# Patient Record
Sex: FEMALE | Race: WHITE | NOT HISPANIC OR LATINO | Employment: FULL TIME | URBAN - METROPOLITAN AREA
[De-identification: names, ages, dates, MRNs, and addresses within clinical notes are randomized per-mention and may not be internally consistent; named-entity substitution may affect disease eponyms.]

---

## 2017-02-07 ENCOUNTER — ALLSCRIPTS OFFICE VISIT (OUTPATIENT)
Dept: OTHER | Facility: OTHER | Age: 42
End: 2017-02-07

## 2017-02-07 DIAGNOSIS — Z12.31 ENCOUNTER FOR SCREENING MAMMOGRAM FOR MALIGNANT NEOPLASM OF BREAST: ICD-10-CM

## 2017-04-28 ENCOUNTER — ALLSCRIPTS OFFICE VISIT (OUTPATIENT)
Dept: OTHER | Facility: OTHER | Age: 42
End: 2017-04-28

## 2017-04-28 DIAGNOSIS — N63.0 BREAST LUMP: ICD-10-CM

## 2017-05-01 ENCOUNTER — HOSPITAL ENCOUNTER (OUTPATIENT)
Dept: MAMMOGRAPHY | Facility: CLINIC | Age: 42
Discharge: HOME | End: 2017-05-01
Payer: COMMERCIAL

## 2017-05-01 ENCOUNTER — HOSPITAL ENCOUNTER (OUTPATIENT)
Dept: ULTRASOUND IMAGING | Facility: CLINIC | Age: 42
Discharge: HOME | End: 2017-05-01
Payer: COMMERCIAL

## 2017-05-01 DIAGNOSIS — N63.0 BREAST LUMP: ICD-10-CM

## 2017-05-01 DIAGNOSIS — N63.0 LUMP OR MASS IN BREAST: ICD-10-CM

## 2017-05-01 PROCEDURE — 76642 ULTRASOUND BREAST LIMITED: CPT

## 2017-05-01 PROCEDURE — G0204 DX MAMMO INCL CAD BI: HCPCS

## 2018-01-13 VITALS
HEIGHT: 67 IN | BODY MASS INDEX: 24.01 KG/M2 | WEIGHT: 153 LBS | DIASTOLIC BLOOD PRESSURE: 70 MMHG | SYSTOLIC BLOOD PRESSURE: 110 MMHG

## 2018-01-14 VITALS
SYSTOLIC BLOOD PRESSURE: 118 MMHG | WEIGHT: 147.38 LBS | BODY MASS INDEX: 23.13 KG/M2 | DIASTOLIC BLOOD PRESSURE: 80 MMHG | HEIGHT: 67 IN

## 2018-01-18 NOTE — PROGRESS NOTES
Assessment    1  Poison ivy (692 6) (L23 7)    Plan  Poison ivy    · PredniSONE 10 MG Oral Tablet; TAKE 6 TABLETS TODAY, THEN DECREASE BY 1  TABLET EACH DAY UNTIL GONE   · Apply cold compresses 4 times a day for 20 minutes to help relieve pain or itching ;  Status:Complete;   Done: 21DMH6069   · Avoid exposing the rash to direct sunlight ; Status:Complete;   Done: 52MYZ7610   · Avoid soaps, lotions, and detergents that contain fragrances, deodorants, and other  additives ; Status:Complete;   Done: 06ZXJ8750   · The following may help your itching and prevent it from returning ; Status:Complete;    Done: 12NTN8660   · Call [phone] if: The rash is not better in 1 week ; Status:Complete;   Done:  02Apr2016 09:34AM   · Call [phone] if: The symptoms come back after a period of time of being normal ;  Status:Complete;   Done: 08MJI4829   · Call [phone] if: There is redness, swelling, or pain in the area ; Status:Complete;    Done: 92ZSS6476   · Call [phone] if: You have signs of infection in or around the affected area ;  Status:Complete;   Done: 84QRI4366   · Call [phone] if: Your rash is worse ; Status:Complete;   Done: 02Apr2016    Discussion/Summary  Discussion Summary:   Cool Compresses, Benadryl for itching, you can also try calamine lotion  follow up with PCP in 2-3 days or sooner if symptoms become worse  Medication Side Effects Reviewed: Possible side effects of new medications were reviewed with the patient/guardian today  Understands and agrees with treatment plan: The treatment plan was reviewed with the patient/guardian  The patient/guardian understands and agrees with the treatment plan   Counseling Documentation With Imm: The patient was counseled regarding instructions for management, patient and family education, importance of compliance with treatment  Follow Up Instructions: Follow Up with your Primary Care Provider in 2-3 days     If your symptoms worsen, go to the nearest [place] [address] Emergency Department  Chief Complaint    1  Rash  Chief Complaint Free Text Note Form: Pt is here with a rash around both ankles that has been there for 3 weeks now and has not improved  History of Present Illness  HPI: This is a 47year old female here today with complaints of poison ivy on her legs  He has noticed red vesicles on her lower legs that are itchy  They have been there for about 3 weeks  She has tried Benadryl spray and hydrocortisone cream with some relief  No other lesions anywhere else  Hospital Based Practices Required Assessment:   Pain Assessment   the patient states they do not have pain  The pain is located in the Ankles  The patient describes the pain as Itching  Abuse And Domestic Violence Screen    Yes, the patient is safe at home  The patient states no one is hurting them  Depression And Suicide Screen  No, the patient has not had thoughts of hurting themself  No, the patient has not felt depressed in the past 7 days  Review of Systems  Focused-Female:   Constitutional: No fever, no chills, feels well, no tiredness, no recent weight gain or loss  Cardiovascular: no complaints of slow or fast heart rate, no chest pain, no palpitations, no leg claudication or lower extremity edema  Respiratory: no complaints of shortness of breath, no wheezing, no dyspnea on exertion, no orthopnea or PND  Integumentary: as noted in HPI  ROS Reviewed:   ROS reviewed  Active Problems    1  Acute maxillary sinusitis (461 0) (J01 00)   2  Irregular menstrual bleeding (626 4) (N92 6)   3  Rash (782 1) (R21)   4  Seasonal allergies (477 9) (J30 2)    Past Medical History    1  No pertinent past medical history  Active Problems And Past Medical History Reviewed: The active problems and past medical history were reviewed and updated today  Family History    1  No pertinent family history    2   No pertinent family history  Family History Reviewed: The family history was reviewed and updated today  Social History    · Never a smoker  Social History Reviewed: The social history was reviewed and updated today  Surgical History    1  History of Appendectomy  Surgical History Reviewed: The surgical history was reviewed and updated today  Current Meds   1  Fluticasone Propionate 50 MCG/ACT Nasal Suspension; USE 1 SPRAY IN EACH   NOSTRIL TWICE DAILY; Therapy: 13UVG4758 to (Last RU:58JOI3072)  Requested for: 03Jun2015 Ordered  Medication List Reviewed: The medication list was reviewed and updated today  Allergies    1  Penicillins    Vitals  Signs [Data Includes: Current Encounter]   Recorded: 02Apr2016 08:56AM   Temperature: 97 7 F  Heart Rate: 64  Respiration: 16  Systolic: 451  Diastolic: 60  Height: 5 ft 7 in  Weight: 150 lb   BMI Calculated: 23 49  BSA Calculated: 1 79  O2 Saturation: 97    Physical Exam    Constitutional   General appearance: No acute distress, well appearing and well nourished  Pulmonary   Respiratory effort: No increased work of breathing or signs of respiratory distress  Auscultation of lungs: Clear to auscultation  Cardiovascular   Auscultation of heart: Normal rate and rhythm, normal S1 and S2, without murmurs  Skin multiple vsicular lesions on bilateral leg, erythema boarder around vesicles  Psychiatric   Orientation to person, place, and time: Normal     Mood and affect: Normal        Signatures   Electronically signed by : Owen Doyle; Apr 2 2016  6:24PM EST                       (Author)    Electronically signed by : Mi Harrison DO;  Apr 4 2016  7:18AM EST                       (Co-author)

## 2018-03-05 ENCOUNTER — OFFICE VISIT (OUTPATIENT)
Dept: OBGYN CLINIC | Facility: CLINIC | Age: 43
End: 2018-03-05
Payer: COMMERCIAL

## 2018-03-05 VITALS
SYSTOLIC BLOOD PRESSURE: 114 MMHG | DIASTOLIC BLOOD PRESSURE: 76 MMHG | HEIGHT: 67 IN | WEIGHT: 157 LBS | BODY MASS INDEX: 24.64 KG/M2

## 2018-03-05 DIAGNOSIS — Z12.39 BREAST CANCER SCREENING: ICD-10-CM

## 2018-03-05 DIAGNOSIS — Z01.419 ENCOUNTER FOR ANNUAL ROUTINE GYNECOLOGICAL EXAMINATION: Primary | ICD-10-CM

## 2018-03-05 PROBLEM — N63.20 LEFT BREAST LUMP: Status: ACTIVE | Noted: 2017-04-28

## 2018-03-05 PROCEDURE — 99396 PREV VISIT EST AGE 40-64: CPT | Performed by: OBSTETRICS & GYNECOLOGY

## 2018-03-05 RX ORDER — CLOTRIMAZOLE AND BETAMETHASONE DIPROPIONATE 10; .64 MG/G; MG/G
CREAM TOPICAL
COMMUNITY
Start: 2017-10-19 | End: 2021-01-04

## 2018-03-05 RX ORDER — MULTIVITAMIN
1 TABLET ORAL DAILY
COMMUNITY

## 2018-03-05 NOTE — PROGRESS NOTES
Subjective  HPI:     Ricardo Whitlock is a 38 y o  female  She is a  3 Para 3, with 3 prior vaginal deliveries  Her menstrual is absent due to uterine ablation 2 years ago  Her current method of contraception includes vasectomy  She denies issues with intimacy  She complains of constipation, states she only has a BM about once a week  She does eat a well balanced diet with increased fiber and drinks adequate water  She exercises regularly  No  complaints  No Gyn complaints  She denies depression/anxiety  She denies any new family stressor or illnesses  Her dental care is up-to-date  Gynecologic History    No LMP recorded (lmp unknown)  Patient has had an ablation  Last Pap:  Results were: normal  Last mammogram: 17  Results were: normal  Was recommend to get a 3D Mammogram      Obstetric History    OB History    Para Term  AB Living   3 3       3   SAB TAB Ectopic Multiple Live Births           3      # Outcome Date GA Lbr Roland/2nd Weight Sex Delivery Anes PTL Lv   3 Para 06/10/11    M       2 Para 07    M       1 Para 10/28/05    F              The following portions of the patient's history were reviewed and updated as appropriate: allergies, current medications, past family history, past medical history, past social history, past surgical history and problem list     Review of Systems    Pertinent items are noted in HPI  Objective     /76   Ht 5' 7" (1 702 m)   Wt 71 2 kg (157 lb)   LMP  (LMP Unknown)   Breastfeeding?  No   BMI 24 59 kg/m²      General appearance: alert and oriented, in no acute distress  Neck: no adenopathy, supple, symmetrical, trachea midline and thyroid not enlarged, symmetric, no tenderness/mass/nodules  Lungs: clear to auscultation bilaterally  Breasts: normal appearance, no masses or tenderness  Heart: regular rate and rhythm, S1, S2 normal, no murmur, click, rub or gallop  Abdomen: soft, non-tender; bowel sounds normal; no masses, no organomegaly  Pelvic: external genitalia normal, urethra without abnormality or discharge, perianal skin: no external genital warts noted, vagina normal without discharge, uterus normal size, shape, and consistency, no cervical motion tenderness, cervix normal in appearance, no adnexal masses or tenderness, adnexae not palpable and no bladder tenderness  Extremities: extremities normal, warm and well-perfused; no cyanosis, clubbing, or edema  Skin: Skin color, texture, turgor normal  No rashes or lesions  Lymph nodes: Cervical adenopathy: normal and Axillary adenopathy: normal      Assessment and Plan    Patient informed of a Stable GYN exam  A pap smear was not performed due a negative HPV pap in 2016  Patient is due for mammogram screening in May, it was recommended she have a 3D mammogram due to her fibrous breast  Advised patient to consult a GI provider regarding her constipation  She is to follow up in one year or sooner if needed  Mammogram ordered  Follow up in: 1 year

## 2018-03-05 NOTE — PATIENT INSTRUCTIONS
Consult a GI provider regarding your constipation  Constipation   AMBULATORY CARE:   Constipation  is when you have hard, dry bowel movements, or you go longer than usual between bowel movements  Constipation may be caused by a lack of water or high-fiber foods  Medicines used to treat pain or depression, or a lack of physical activity may also cause constipation  Common symptoms include the following:   · Trouble pushing out your bowel movement    · Pain or bleeding during your bowel movement    · A feeling that you did not finish having your bowel movement    · Nausea    · Bloating    · Headache  Seek immediate care for the following symptoms:   · Blood in your bowel movement    · A fever and abdominal pain with the constipation  Contact your healthcare provider if:   · Your constipation gets worse  · You start to vomit  · You have questions or concerns about your condition or care  Medicines:   · Medicine or a fiber supplement  may help make your bowel movement softer  A laxative may help relax and loosen your intestines to help you have a bowel movement  You may also be given medicine to increase fluid in your intestines  The fluid may help move bowel movements through your intestines  · Take your medicine as directed  Contact your healthcare provider if you think your medicine is not helping or if you have side effects  Tell him of her if you are allergic to any medicine  Keep a list of the medicines, vitamins, and herbs you take  Include the amounts, and when and why you take them  Bring the list or the pill bottles to follow-up visits  Carry your medicine list with you in case of an emergency  Manage or prevent constipation:   · Drink liquids as directed  You may need to drink extra liquids to help soften and move your bowels  Ask how much liquid to drink each day and which liquids are best for you  · Eat high-fiber foods    This may help decrease constipation by adding bulk to your bowel movements  High-fiber foods include fruit, vegetables, whole-grain breads and cereals, and beans  Your healthcare provider or dietitian can help you create a high-fiber meal plan  · Exercise regularly  Regular physical activity can help stimulate your intestines  Ask which exercises are best for you  · Schedule a time each day to have a bowel movement  This may help train your body to have regular bowel movements  Bend forward while you are on the toilet to help move the bowel movement out  Sit on the toilet for at least 10 minutes, even if you do not have a bowel movement  Follow up with your healthcare provider as directed:  Write down your questions so you remember to ask them during your visits  © 2017 [address] Information is for End User's use only and may not be sold, redistributed or otherwise used for commercial purposes  All illustrations and images included in CareNotes® are the copyrighted property of [employer]  or Hesham Sawyer  The above information is an  only  It is not intended as medical advice for individual conditions or treatments  Talk to your doctor, nurse or pharmacist before following any medical regimen to see if it is safe and effective for you

## 2018-04-30 ENCOUNTER — TRANSCRIBE ORDERS (OUTPATIENT)
Dept: RADIOLOGY | Facility: CLINIC | Age: 43
End: 2018-04-30

## 2018-05-02 ENCOUNTER — HOSPITAL ENCOUNTER (OUTPATIENT)
Dept: RADIOLOGY | Age: 43
Discharge: HOME | End: 2018-05-02
Payer: COMMERCIAL

## 2018-05-02 PROCEDURE — 77063 BREAST TOMOSYNTHESIS BI: CPT

## 2018-05-02 PROCEDURE — 77067 SCR MAMMO BI INCL CAD: CPT

## 2018-05-03 ENCOUNTER — TELEPHONE (OUTPATIENT)
Dept: OBGYN CLINIC | Facility: CLINIC | Age: 43
End: 2018-05-03

## 2018-05-03 NOTE — TELEPHONE ENCOUNTER
Lm pt's as re: 3D mgram results 5/3/18 - "extremely dense" breasts - to continue 3D screening mgram, can have ABUS now

## 2018-05-03 NOTE — TELEPHONE ENCOUNTER
----- Message from Bryson Zelaya, [address] sent at 5/3/2018 10:11 AM EDT -----  Please inform patient of normal mammogram results

## 2018-05-03 NOTE — TELEPHONE ENCOUNTER
Pt informed of mgram results & recom to continue 3D screening mgram - given dx & cpt codes for ABUS - she will recall office if wants to schedule ABUS

## 2018-05-07 ENCOUNTER — TELEPHONE (OUTPATIENT)
Dept: OBGYN CLINIC | Facility: CLINIC | Age: 43
End: 2018-05-07

## 2018-05-07 DIAGNOSIS — R92.2 DENSE BREAST TISSUE ON MAMMOGRAM: Primary | ICD-10-CM

## 2018-05-17 ENCOUNTER — HOSPITAL ENCOUNTER (OUTPATIENT)
Dept: ULTRASOUND IMAGING | Facility: CLINIC | Age: 43
Discharge: HOME | End: 2018-05-17
Payer: COMMERCIAL

## 2018-05-17 DIAGNOSIS — R92.2 DENSE BREAST TISSUE ON MAMMOGRAM: ICD-10-CM

## 2018-05-17 PROCEDURE — 76641 ULTRASOUND BREAST COMPLETE: CPT

## 2018-05-17 PROCEDURE — 76377 3D RENDER W/INTRP POSTPROCES: CPT

## 2018-05-21 ENCOUNTER — HOSPITAL ENCOUNTER (OUTPATIENT)
Dept: ULTRASOUND IMAGING | Facility: CLINIC | Age: 43
Discharge: HOME | End: 2018-05-21
Payer: COMMERCIAL

## 2018-05-21 DIAGNOSIS — R92.8 ABNORMAL ULTRASOUND OF BREAST: ICD-10-CM

## 2018-05-21 PROCEDURE — 76642 ULTRASOUND BREAST LIMITED: CPT

## 2018-05-21 NOTE — PROGRESS NOTES
Met with patient and Dr Quin Alegria regarding recommendation for;      _____ RIGHT ___x___LEFT      __x___Ultrasound guided  ______Stereotactic  Breast biopsy  ___x__Verbalized understanding        Blood thinners:  _____yes __x___no    Date stopped: ___n/a________    Biopsy teaching sheet given:  __x_____yes ______no

## 2018-05-22 ENCOUNTER — TELEPHONE (OUTPATIENT)
Dept: OBGYN CLINIC | Facility: CLINIC | Age: 43
End: 2018-05-22

## 2018-05-22 NOTE — TELEPHONE ENCOUNTER
----- Message from Lloyd Alberto, [address] sent at 5/21/2018  9:14 AM EDT -----  Please inform patient of results and need US of bilateral breast

## 2018-05-22 NOTE — TELEPHONE ENCOUNTER
Reviewing pt's chart - pt had diag bilat u/s done 5/21/18 - recom 6 month f/u (R) breast U/S for prob complicated cyst, recom u/s guided core needle biopsy (L) breast

## 2018-05-22 NOTE — TELEPHONE ENCOUNTER
----- Message from Jessica Rivera, [address] sent at 5/21/2018  9:14 AM EDT -----  Please inform patient of results and need US of bilateral breast

## 2018-05-22 NOTE — TELEPHONE ENCOUNTER
Lm pt's as - pt had ABUS for f/u extremely dense breast tissue on mgram - recom diag bilateral u/s f/u (L) breast hypoechoic lesion & (R) breast lesion c/w complicated cyst or debris filled duct

## 2018-05-25 ENCOUNTER — HOSPITAL ENCOUNTER (OUTPATIENT)
Dept: ULTRASOUND IMAGING | Facility: CLINIC | Age: 43
Discharge: HOME | End: 2018-05-25
Admitting: RADIOLOGY
Payer: COMMERCIAL

## 2018-05-25 ENCOUNTER — HOSPITAL ENCOUNTER (OUTPATIENT)
Dept: ULTRASOUND IMAGING | Facility: CLINIC | Age: 43
Discharge: HOME | End: 2018-05-25
Payer: COMMERCIAL

## 2018-05-25 VITALS — SYSTOLIC BLOOD PRESSURE: 98 MMHG | HEART RATE: 48 BPM | DIASTOLIC BLOOD PRESSURE: 70 MMHG

## 2018-05-25 DIAGNOSIS — R92.8 ABNORMAL FINDINGS ON DIAGNOSTIC IMAGING OF BREAST: ICD-10-CM

## 2018-05-25 DIAGNOSIS — R92.8 ABNORMAL MAMMOGRAM: ICD-10-CM

## 2018-05-25 PROCEDURE — 88342 IMHCHEM/IMCYTCHM 1ST ANTB: CPT | Performed by: PATHOLOGY

## 2018-05-25 PROCEDURE — 88305 TISSUE EXAM BY PATHOLOGIST: CPT | Performed by: PATHOLOGY

## 2018-05-25 PROCEDURE — 88341 IMHCHEM/IMCYTCHM EA ADD ANTB: CPT | Performed by: PATHOLOGY

## 2018-05-25 PROCEDURE — 19083 BX BREAST 1ST LESION US IMAG: CPT

## 2018-05-25 RX ORDER — LIDOCAINE HYDROCHLORIDE 10 MG/ML
5 INJECTION, SOLUTION INFILTRATION; PERINEURAL ONCE
Status: COMPLETED | OUTPATIENT
Start: 2018-05-25 | End: 2018-05-25

## 2018-05-25 RX ADMIN — LIDOCAINE HYDROCHLORIDE 5 ML: 10 INJECTION, SOLUTION INFILTRATION; PERINEURAL at 10:22

## 2018-05-25 NOTE — PROGRESS NOTES
Ice pack given:    __x___yes _____no    Discharge instructions signed by patient:    ___x__yes _____no    Discharge instructions given to patient:    __x_yes _____no    Discharged via:    _x____amulatory    _____wheelchair    _____stretcher    Stable on discharge:    __x___yes ____no

## 2018-05-25 NOTE — PROGRESS NOTES
Patient arrived via:    __x___ambulatory    _____wheelchair    _____stretcher      Domestic violence screen    ___x___negative______positive    Breast Implants:    _______yes ___x_____no

## 2018-05-25 NOTE — PROGRESS NOTES
POST LARGE CORE BREAST BIOPSY PATIENT INFORMATION      1  Place an ice pack inside your bra over the top of the dressing every hour for 20 minutes (20 minutes on, 60 minutes off)  Do this until bedtime  2  Do not shower or bathe until the following morning  3  You may bathe your breast carefully with the steri-strips in place  Be careful    Not to loosen them  The steri-strips will fall off in 3-5 days  4  You may have mild discomfort, and you may have some bruising where the   Needle entered the skin  This should clear within 5-7 days  5  If you need medicine for discomfort, take acetaminophen products such as   Tylenol  You may also take Advil or Motrin products  6  Do not participate in strenuous activities such as-tennis, aerobics, skiing,  Weight lifting, etc  for 24 hours  Refrain from swimming/soaking for 72 hours  7  Wearing a bra for sleeping may be more comfortable for the first 24-48 hours  8  Watch for continued bleeding, pain or fever over 101; please call with any questions or concerns  For procedures done at the [facility] [place] [facility]  [facility] call:  Regina Isaacs RN at [phone]  Brittany Dobson RN at [phone]                    *After 4 PM call the Interventional Radiology Department                    [phone] and ask to speak with the nurse on call  For procedures done at the [address] [place] [address] call:         Rosita Bryant RN at   *After 4 PM call the Interventional Radiology Department   [phone] and ask to speak with the nurse on call  For procedures done at [address] [place] [address] call: The Radiology Nurse at [phone]  *After 4 PM call your physician, or go to the Emergency Department  9          The final results of your biopsy are usually available within one week

## 2018-05-25 NOTE — PROGRESS NOTES
Procedure type:    __x___ultrasound guided _____stereotactic    Breast:    __x___Left _____Right    Location: 2:00 7cmfn    Needle: 12g Dandre    # of passes: 3    Clip: Secure Eugenio Cylinder    Performed by: Dr Blanca Herndon held for 5 minutes by: Noreen Cabot, RN Steri Strips:    __x___yes _____no    Kami Ruby aid:    __x___yes_____no    Tape and guaze:    _____yes __x___no      Tolerated procedure:    __x___yes _____no

## 2018-05-29 NOTE — PROGRESS NOTES
Post procedure call completed on 5/29/18 @ 1104      Bleeding: _____yes ___x__no    Pain: _____yes __x____no    Redness/Swelling: ______yes ___x___no    Band aid removed: __x___yes _____no    Steri-Strips intact: ___x___yes _____no

## 2018-05-31 ENCOUNTER — TELEPHONE (OUTPATIENT)
Dept: OBGYN CLINIC | Facility: CLINIC | Age: 43
End: 2018-05-31

## 2018-05-31 ENCOUNTER — TELEPHONE (OUTPATIENT)
Dept: MAMMOGRAPHY | Facility: CLINIC | Age: 43
End: 2018-05-31

## 2018-06-05 ENCOUNTER — TELEPHONE (OUTPATIENT)
Dept: OBGYN CLINIC | Facility: CLINIC | Age: 43
End: 2018-06-05

## 2018-06-05 DIAGNOSIS — N64.9 BREAST LESION: Primary | ICD-10-CM

## 2018-06-05 NOTE — TELEPHONE ENCOUNTER
----- Message from Mario Alberto [place], [address] sent at 5/21/2018  9:14 AM EDT -----  Please inform patient of results and need US of bilateral breast

## 2018-06-05 NOTE — TELEPHONE ENCOUNTER
Pt aware (L) breast biopsy pathology benign fibroadenoma, recom 6 month f/u diag (R) breast u/s (due 11/2018) - rad order inEpic  Pt will schedule appt time

## 2018-12-05 ENCOUNTER — HOSPITAL ENCOUNTER (OUTPATIENT)
Dept: ULTRASOUND IMAGING | Facility: CLINIC | Age: 43
Discharge: HOME | End: 2018-12-05
Payer: COMMERCIAL

## 2018-12-05 ENCOUNTER — HOSPITAL ENCOUNTER (OUTPATIENT)
Dept: ULTRASOUND IMAGING | Facility: CLINIC | Age: 43
Discharge: HOME | End: 2018-12-05

## 2018-12-05 VITALS — HEIGHT: 67 IN | BODY MASS INDEX: 23.54 KG/M2 | WEIGHT: 150 LBS

## 2018-12-05 DIAGNOSIS — N64.9 BREAST LESION: ICD-10-CM

## 2018-12-05 PROCEDURE — 76642 ULTRASOUND BREAST LIMITED: CPT

## 2018-12-11 ENCOUNTER — TELEPHONE (OUTPATIENT)
Dept: OBGYN CLINIC | Facility: CLINIC | Age: 43
End: 2018-12-11

## 2019-04-22 ENCOUNTER — ANNUAL EXAM (OUTPATIENT)
Dept: OBGYN CLINIC | Facility: CLINIC | Age: 44
End: 2019-04-22
Payer: COMMERCIAL

## 2019-04-22 VITALS
SYSTOLIC BLOOD PRESSURE: 100 MMHG | HEIGHT: 67 IN | WEIGHT: 152.2 LBS | DIASTOLIC BLOOD PRESSURE: 70 MMHG | BODY MASS INDEX: 23.89 KG/M2

## 2019-04-22 DIAGNOSIS — Z12.39 BREAST CANCER SCREENING: ICD-10-CM

## 2019-04-22 DIAGNOSIS — Z01.419 WOMEN'S ANNUAL ROUTINE GYNECOLOGICAL EXAMINATION: Primary | ICD-10-CM

## 2019-04-22 PROCEDURE — 87624 HPV HI-RISK TYP POOLED RSLT: CPT | Performed by: OBSTETRICS & GYNECOLOGY

## 2019-04-22 PROCEDURE — G0145 SCR C/V CYTO,THINLAYER,RESCR: HCPCS | Performed by: OBSTETRICS & GYNECOLOGY

## 2019-04-22 PROCEDURE — S0612 ANNUAL GYNECOLOGICAL EXAMINA: HCPCS | Performed by: OBSTETRICS & GYNECOLOGY

## 2019-04-23 LAB
HPV HR 12 DNA CVX QL NAA+PROBE: NEGATIVE
HPV16 DNA CVX QL NAA+PROBE: NEGATIVE
HPV18 DNA CVX QL NAA+PROBE: NEGATIVE

## 2019-04-25 LAB
LAB AP GYN PRIMARY INTERPRETATION: NORMAL
Lab: NORMAL

## 2019-05-24 ENCOUNTER — HOSPITAL ENCOUNTER (OUTPATIENT)
Dept: RADIOLOGY | Age: 44
Discharge: HOME | End: 2019-05-24
Payer: COMMERCIAL

## 2019-05-24 VITALS — BODY MASS INDEX: 23.54 KG/M2 | HEIGHT: 67 IN | WEIGHT: 150 LBS

## 2019-05-24 DIAGNOSIS — Z12.39 BREAST CANCER SCREENING: ICD-10-CM

## 2019-05-24 PROCEDURE — 77067 SCR MAMMO BI INCL CAD: CPT

## 2019-05-24 PROCEDURE — 77063 BREAST TOMOSYNTHESIS BI: CPT

## 2020-05-29 ENCOUNTER — HOSPITAL ENCOUNTER (OUTPATIENT)
Dept: RADIOLOGY | Age: 45
Discharge: HOME | End: 2020-05-29
Payer: COMMERCIAL

## 2020-05-29 VITALS — BODY MASS INDEX: 23.54 KG/M2 | HEIGHT: 67 IN | WEIGHT: 150 LBS

## 2020-05-29 DIAGNOSIS — Z12.31 ENCOUNTER FOR SCREENING MAMMOGRAM FOR MALIGNANT NEOPLASM OF BREAST: ICD-10-CM

## 2020-05-29 PROCEDURE — 77067 SCR MAMMO BI INCL CAD: CPT

## 2020-05-29 PROCEDURE — 77063 BREAST TOMOSYNTHESIS BI: CPT

## 2020-06-02 ENCOUNTER — OFFICE VISIT (OUTPATIENT)
Dept: OBGYN CLINIC | Facility: CLINIC | Age: 45
End: 2020-06-02
Payer: COMMERCIAL

## 2020-06-02 VITALS
DIASTOLIC BLOOD PRESSURE: 80 MMHG | TEMPERATURE: 98.7 F | SYSTOLIC BLOOD PRESSURE: 118 MMHG | BODY MASS INDEX: 24.42 KG/M2 | WEIGHT: 155.6 LBS | HEIGHT: 67 IN

## 2020-06-02 DIAGNOSIS — R59.9 PALPABLE LYMPH NODE: Primary | ICD-10-CM

## 2020-06-02 PROCEDURE — 99213 OFFICE O/P EST LOW 20 MIN: CPT | Performed by: OBSTETRICS & GYNECOLOGY

## 2020-08-07 ENCOUNTER — OFFICE VISIT (OUTPATIENT)
Dept: FAMILY MEDICINE CLINIC | Facility: CLINIC | Age: 45
End: 2020-08-07
Payer: COMMERCIAL

## 2020-08-07 VITALS
HEART RATE: 60 BPM | WEIGHT: 149 LBS | OXYGEN SATURATION: 99 % | DIASTOLIC BLOOD PRESSURE: 80 MMHG | SYSTOLIC BLOOD PRESSURE: 124 MMHG | BODY MASS INDEX: 23.34 KG/M2 | TEMPERATURE: 96.9 F

## 2020-08-07 DIAGNOSIS — H69.81 ACUTE DYSFUNCTION OF RIGHT EUSTACHIAN TUBE: Primary | ICD-10-CM

## 2020-08-07 DIAGNOSIS — M25.521 RIGHT ELBOW PAIN: ICD-10-CM

## 2020-08-07 PROBLEM — H69.91 ACUTE DYSFUNCTION OF RIGHT EUSTACHIAN TUBE: Status: ACTIVE | Noted: 2020-08-07

## 2020-08-07 PROCEDURE — 99203 OFFICE O/P NEW LOW 30 MIN: CPT | Performed by: FAMILY MEDICINE

## 2020-08-07 PROCEDURE — 1036F TOBACCO NON-USER: CPT | Performed by: FAMILY MEDICINE

## 2020-08-07 PROCEDURE — 3725F SCREEN DEPRESSION PERFORMED: CPT | Performed by: FAMILY MEDICINE

## 2020-08-07 RX ORDER — FLUTICASONE PROPIONATE 50 MCG
2 SPRAY, SUSPENSION (ML) NASAL DAILY
Qty: 1 BOTTLE | Refills: 1 | Status: SHIPPED | OUTPATIENT
Start: 2020-08-07 | End: 2021-01-04 | Stop reason: ALTCHOICE

## 2020-08-07 NOTE — PROGRESS NOTES
Family Medicine Follow-Up Office Visit  Sanchez Delaney 45 y o  female   MRN: [id number] : 1975  ENCOUNTER: 2020 11:43 AM    Assessment and Plan   Acute dysfunction of right eustachian tube  Persistent despite tx for infection (topically and systemically) and antihistamine use  Recommended trial of fluticasone nasal spray and ENT eval     Right elbow pain  Concerning for lateral epicondylitis +/- component of bicipital tendinopathy  Referral placed for PT      RTC after all evals complete    Chief Complaint     Chief Complaint   Patient presents with    Earache       History of Present Illness   Sanchez Delaney is a 45y o -year-old female who presents today for est of care  Endometrial ablation 2015  Sees [facility] for GYN care  Started with R ear fullness after swimming in the pool about 1mo ago, used Swimmer's ear drops, didn't help, called prior PCP, ordered rx drops for 10d, still no better, went to [facility], rx'ed Ceftin for 10d  Still with pain in front of the R ear, with some fullness and "humming noise" in the ear  Does have seasonal allergies  Had prednisone recommended but did not take  Notes some R lat epicondyle and R should pain, bere with shoulder press and pushups  No golf or tennis  Psoriasis - sees [facility]  Review of Systems   Review of Systems   Constitutional: Negative for activity change, chills, fatigue and fever  HENT: Positive for ear pain and hearing loss  Negative for congestion, sinus pressure, sinus pain and sore throat  Respiratory: Negative for cough, shortness of breath and wheezing  Cardiovascular: Negative for chest pain, palpitations and leg swelling  Gastrointestinal: Negative for abdominal pain, diarrhea, nausea and vomiting  Genitourinary: Negative for decreased urine volume, dysuria, frequency and urgency  Musculoskeletal: Positive for arthralgias  Negative for myalgias, neck pain and neck stiffness     Skin: Negative for rash  Neurological: Negative for dizziness, light-headedness, numbness and headaches  Active Problem List     Patient Active Problem List   Diagnosis    Left breast lump    Seasonal allergies    Right elbow pain    Acute dysfunction of right eustachian tube       Past Medical History, Past Surgical History, Family History, and Social History were reviewed and updated today as appropriate  Objective   /80   Pulse 60   Temp (!) 96 9 °F (36 1 °C)   Wt 67 6 kg (149 lb)   SpO2 99%   BMI 23 34 kg/m²     Physical Exam  Constitutional:       General: She is not in acute distress  Appearance: She is well-developed  HENT:      Head: Normocephalic and atraumatic  Ears:      Comments: Bilateral mild effusion noted behind TM, R>L  No evidence of acute infection  Eyes:      Pupils: Pupils are equal, round, and reactive to light  Neck:      Musculoskeletal: Normal range of motion and neck supple  Cardiovascular:      Rate and Rhythm: Normal rate and regular rhythm  Heart sounds: Normal heart sounds  No murmur  No friction rub  No gallop  Pulmonary:      Effort: Pulmonary effort is normal  No respiratory distress  Breath sounds: Normal breath sounds  No wheezing or rales  Abdominal:      General: There is no distension  Palpations: Abdomen is soft  Musculoskeletal: Normal range of motion  General: Tenderness (over R lateral epicondyle) present  Neurological:      Mental Status: She is alert and oriented to person, place, and time  Psychiatric:         Behavior: Behavior normal          Thought Content:  Thought content normal        Diabetic Foot Exam    Pertinent Laboratory/Diagnostic Studies:  No results found for: GLUCOSE, BUN, CREATININE, CALCIUM, NA, K, CO2, CL  No results found for: ALT, AST, GGT, ALKPHOS, BILITOT    No results found for: WBC, HGB, HCT, MCV, PLT    No results found for: TSH    No results found for: CHOL  No results found for: TRIG  No results found for: HDL  No results found for: LDLCALC  No results found for: HGBA1C    Results for orders placed or performed in visit on 04/22/19   HPV High Risk    Specimen: Cervix; Thin-Prep Vial   Result Value Ref Range    HPV Other HR Negative Negative    HPV16 Negative Negative    HPV18 Negative Negative   Liquid-based pap, screening   Result Value Ref Range    Case Report       Gynecologic Cytology Report                       Case: [id number]                                  Authorizing Provider:  Parviz Mckeon MD         Collected:           04/22/2019 163              Ordering Location:     [facility]      Received:            04/22/2019 1639              First Screen:          Ressie Sink                                                                  Specimen:    LIQUID-BASED PAP, SCREENING, Cervix                                                        Primary Interpretation Negative for intraepithelial lesion or malignancy     Specimen Adequacy       Satisfactory for evaluation  Endocervical/transformation zone component present  Additional Information       [employer]'s FDA approved ,  and ThinPrep Imaging Duo System are utilized with strict adherence to the 's instruction manual to prepare gynecologic and non-gynecologic cytology specimens for the production of ThinPrep slides as well as for gynecologic ThinPrep imaging  These processes have been validated by our laboratory and/or by the   The Pap test is not a diagnostic procedure and should not be used as the sole means to detect cervical cancer  It is only a screening procedure to aid in the detection of cervical cancer and its precursors  Both false-negative and false-positive results have been experienced  Your patient's test result should be interpreted in this context together with the history and clinical findings        LMP         Orders Placed This Encounter   Procedures    Ambulatory referral to Physical Therapy    Ambulatory Referral to Otolaryngology         Current Medications     Current Outpatient Medications   Medication Sig Dispense Refill    clotrimazole-betamethasone (LOTRISONE) 1-0 05 % cream Apply topically      fluticasone (FLONASE) 50 mcg/act nasal spray 2 sprays into each nostril daily 1 Bottle 1    Multiple Vitamin (MULTIVITAMIN) tablet Take 1 tablet by mouth daily       No current facility-administered medications for this visit  ALLERGIES:  Allergies   Allergen Reactions    Penicillins     Doxycycline Hives    Penicillin G Rash       Health Maintenance     Health Maintenance   Topic Date Due    Depression Screening PHQ  10/15/1989    HIV Screening  10/15/1992    DTaP,Tdap,and Td Vaccines (1 - Tdap) 10/15/1998    Annual Physical  04/22/2020    Influenza Vaccine  07/01/2020    MAMMOGRAM  05/29/2021    BMI: Adult  08/07/2021    Cervical Cancer Screening  04/22/2024    Pneumococcal Vaccine: Pediatrics (0 to 5 Years) and At-Risk Patients (6 to 59 Years)  Aged Out    HIB Vaccine  Aged Out    Hepatitis B Vaccine  Aged Out    IPV Vaccine  Aged Out    Hepatitis A Vaccine  Aged Out    Meningococcal ACWY Vaccine  Aged Out    HPV Vaccine  Aged [employer] History   Administered Date(s) Administered    INFLUENZA 10/01/2007, 11/08/2018         Radha Colón MD   [address]  8/7/2020  11:43 AM    Parts of this note were dictated using [employer] dictation software and may have sounds-like errors due to variation in pronunciation

## 2020-08-07 NOTE — ASSESSMENT & PLAN NOTE
Concerning for lateral epicondylitis +/- component of bicipital tendinopathy    Referral placed for PT

## 2020-08-07 NOTE — ASSESSMENT & PLAN NOTE
Persistent despite tx for infection (topically and systemically) and antihistamine use    Recommended trial of fluticasone nasal spray and ENT eval

## 2020-08-12 ENCOUNTER — EVALUATION (OUTPATIENT)
Dept: PHYSICAL THERAPY | Age: 45
End: 2020-08-12
Payer: COMMERCIAL

## 2020-08-12 DIAGNOSIS — M25.521 RIGHT ELBOW PAIN: Primary | ICD-10-CM

## 2020-08-12 PROCEDURE — 97162 PT EVAL MOD COMPLEX 30 MIN: CPT | Performed by: PHYSICAL THERAPIST

## 2020-08-12 PROCEDURE — 97110 THERAPEUTIC EXERCISES: CPT | Performed by: PHYSICAL THERAPIST

## 2020-08-12 NOTE — PROGRESS NOTES
PT Evaluation     Today's date: 2020  Patient name: Sarah Nunez  : 1975  MRN: [id number]  Referring provider: Shukri Connelly MD  Dx:   Encounter Diagnosis     ICD-10-CM    1  Right elbow pain  M25 521                   Assessment  Assessment details: PT IE: 2020  Patient denies specific onset or cause of right elbow and hand symptoms this insidious nature of greater than 6 months  Patient noted specific location of medial right elbow joint and biceps region  Patient reported onset of right elbow pain that is a shooting pain with lifting  Patient noted she has right hand pain with right elbow pain that creates sensation of right hand weakness in which she will drop things  Patient noted she has stopped her lifting recreational activities due to right medial elbow and hand pain  Patient denies cervical spine movement reproducing right elbow and hand pain  Patient noted she is ambidextrous but writes with her left hand  Patient noted right elbow and hand pain has limited her fitness activities in which she noticed she has gotten weaker in right ue  Patient noted picking up and lifting items are limited by right medial elbow and hand pain  Patient noted only rest reduces her pain, but does not fully resolve her pain  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 with typing only intermittent deficits with mouse when pain aggravated  Patient denies diagnostic testing  Patient noted when aggravated twisting jars are difficult  Patient denies use of oral medication  Patient presents with right [address] joint impingement syndrome reproducing right biceps and medial elbow due to compensation techniques    Impairments: abnormal or restricted ROM, abnormal movement, activity intolerance, lacks appropriate home exercise program and pain with function  Understanding of Dx/Px/POC: excellent   Prognosis: good  Prognosis details: Patient is a 45y o  year old female seen for outpatient PT evaluation with pain, mobility and functional deficits due to right elbow region pain  Patient presents to PT IE with the following problems, concerns, deficits and impairments: right medial elbow / biceps region pain, decreased right ue range of motion, decreased right ue strength, decrease in postural awareness, + special tests, functional limitations and decreased tolerance to activity  Patient would benefit from skilled PT services under the following PT treatment plan to address the above noted deficits: therapeutic exercises and activities to facilitate right ue rom and strength, modalities, manual therapy techniques, postural reeducation and strengthening, IASTM techniques ,kinesio taping techniques and a hep  Thank you for the referral      Goals  Short Term goals 2 - 4 weeks  1  Patient will be independent HEP  2   Patient will report a 25 - 50% decrease in pain complaints  3   Increase strength 1/2 grade  4   Increase ROM 5-10 degrees  Long Term goals 4 - 8 weeks  1  Patient will report elimination of pain complaints  2   Patient will return to all work related activities without restriction  3   Patient will return to all recreational activities without restriction  4   ROM WFL  5   Strength 5/5   6   Patient will report ability to resume all fitness activities  7   Patient will report she is able to open a jar without right ue pain  8   Patient will report she no longer drops any items due to pain  9   Patient will report she is able to reach out into abduction or across her body during functional activities without pain aggravation  10   Patient will report being able to resume lifting and picking up items without pain limitations      Plan  Patient would benefit from: skilled physical therapy  Planned modality interventions: cryotherapy, TENS, thermotherapy: hydrocollator packs and unattended electrical stimulation  Planned therapy interventions: manual therapy, massage, joint mobilization, body mechanics training, patient education, postural training, self care, strengthening, stretching, compression, therapeutic activities, therapeutic exercise, therapeutic training, flexibility, functional ROM exercises, graded activity, graded exercise, graded motor and home exercise program  Frequency: 1x week  Duration in weeks: 4  Treatment plan discussed with: patient        Subjective Evaluation    History of Present Illness  Mechanism of injury: Patient's PMHx is unremarkable  Pain  At best pain ratin  At worst pain ratin  Location: right medial elbow and hand pain    Patient Goals  Patient goals for therapy: return to sport/leisure activities, increased strength and decreased pain  Patient goal: Patient's goal is to resume resistance training without right elbow / biceps region pain  Objective     Tenderness     Additional Tenderness Details  Patient is - TTP at right medial epicondyle, posterior and lateral epicondyle regions  Patient exhibits protracted cervical spine posture at minimal levels  Active Range of Motion   Cervical/Thoracic Spine       Cervical    Flexion: 45 degrees   Extension: 40 degrees      Left lateral flexion: 40 degrees      Right lateral flexion: 45 degrees      Left rotation: 60 degrees  Right rotation: 62 degrees         Left Shoulder   Flexion: 150 degrees   Abduction: 160 degrees     Right Shoulder   Flexion: 154 degrees   Abduction: 145 degrees with pain    Left Elbow   Flexion: 145 degrees   Extension: -2 degrees   Forearm supination: 95 degrees   Forearm pronation: 85 degrees     Right Elbow   Flexion: 148 degrees   Extension: -2 degrees   Forearm supination: 95 degrees   Forearm pronation: 85 degrees     Additional Active Range of Motion Details  Patient's active right shoulder abduction produced right biceps region pain      Strength/Myotome Testing     Left Shoulder     Planes of Motion   Flexion: 4+   Abduction: 4   External rotation at 0°: 4+   Internal rotation at 0°: 4     Right Shoulder     Planes of Motion   Flexion: 4+   Abduction: 4+   External rotation at 0°: 4+   Internal rotation at 0°: 4+     Left Elbow   Flexion: 5  Extension: 5  Forearm supination: 5  Forearm pronation: 5    Right Elbow   Flexion: 5  Extension: 5  Forearm supination: 5  Forearm pronation: 5    Left Wrist/Hand   Wrist extension: 5  Wrist flexion: 5     (2nd hand position)     Trial 1: 52    Trial 2: 62    Trial 3: 56    Thumb Strength  Key/Lateral Pinch     Trial 1: 16    Trial 2: 16    Trial 3: 16    Average: 16    Right Wrist/Hand   Wrist extension: 5  Wrist flexion: 5     (2nd hand position)     Trial 1: 44    Trial 2: 48    Trial 3: 42    Thumb Strength   Key/Lateral Pinch     Trial 1: 17    Trial 2: 18    Trial 3: 17    Average: 17 33    Tests     Right Shoulder   Positive empty can and Hawkin's  Negative Yergason's  Right Elbow   Negative passive medial epicondylitis, valgus stress at 0 degrees, valgus stress at 30 degrees, varus stress at 0 degrees and varus stress at 30 degrees  Flowsheet Rows      Most Recent Value   PT/OT G-Codes   Current Score  69   Projected Score  76             Precautions: Right elbow pain aggravation        Manuals 8/12            Kinesio taping in "Y" pattern for right GH joint with base inferior to deltoid tuberosity and tails at anterior and posterior deltoid at 25-50 % tension and additional "I" strip diagonal to base of "Y" at 25 % tension upward 10 min                                                   Neuro Re-Ed                                                                                                        Ther Ex             ube             Corner pec stretch:B: at 45 degrees             Scapular squeezes 3 sec x 5            Seated postural correction slough over correct             Cervical spine retraction             Wall slides shoulder flexion and scaption 5 x            Shoulder scaption and flexion:B: 5 x 2#           Biceps curls:B:             Wall push ups with physioball             tband rows and extension:B: Green x  5            tband ir and er:R:             Supine shoulder flexion with spc:B:             Triceps extension and scap punches:R             Right shoulder ER and abduction in left side lying             Prone Lori's 1,2&5                                                                                                        Ther Activity                                       Gait Training                                       Modalities

## 2020-08-14 DIAGNOSIS — H69.81 ACUTE DYSFUNCTION OF RIGHT EUSTACHIAN TUBE: Primary | ICD-10-CM

## 2020-08-14 RX ORDER — PREDNISONE 20 MG/1
40 TABLET ORAL DAILY
Qty: 10 TABLET | Refills: 0 | Status: SHIPPED | OUTPATIENT
Start: 2020-08-14 | End: 2020-08-19

## 2020-08-14 NOTE — PROGRESS NOTES
Spoke with patient, she reports that her ear pain and pressure is not better and she still feels like it needs to pop  Her ENT appt is still weeks out  I recommended trial of a short course of steroids, patient agreed  Will rx 40mg prednisone x 5 days  SE discussed

## 2020-08-19 ENCOUNTER — OFFICE VISIT (OUTPATIENT)
Dept: PHYSICAL THERAPY | Age: 45
End: 2020-08-19
Payer: COMMERCIAL

## 2020-08-19 DIAGNOSIS — M25.521 RIGHT ELBOW PAIN: Primary | ICD-10-CM

## 2020-08-19 PROCEDURE — 97110 THERAPEUTIC EXERCISES: CPT | Performed by: PHYSICAL THERAPIST

## 2020-08-19 NOTE — PROGRESS NOTES
Daily Note     Today's date: 2020  Patient name: Jayashree Peacock  : 1975  MRN: [id number]  Referring provider: Nelson Camargo MD  Dx:   Encounter Diagnosis     ICD-10-CM    1  Right elbow pain  M25 521                   Subjective: Patient denies right ue symptoms and she noted she was able to lift some light weight without pain aggravation  Objective: See treatment diary below  Assessment: Tolerated treatment well  Patient demonstrated fatigue post treatment  Patient presents with only side lying ER as pain limiting which exhibits RTC weakness  Thus, pt provided with new written hep to promote right [address] joint rom and strength  Plan: Continue per plan of care  Precautions: Right elbow pain aggravation        Manuals            Kinesio taping in "Y" pattern for right GH joint with base inferior to deltoid tuberosity and tails at anterior and posterior deltoid at 25-50 % tension and additional "I" strip diagonal to base of "Y" at 25 % tension upward 10 min hold                                                  Neuro Re-Ed                                                                                                        Ther Ex             ube  4/4 minutes           Corner pec stretch:B: at 90 degrees  20 sec x 5           Scapular squeezes 3 sec x 5 3 sec x 20           Seated postural correction slough over correct  3 sec x 20           Cervical spine retraction  2 x 10           Wall slides shoulder flexion and scaption 5 x 2 x 10           Shoulder scaption and flexion:B: 5 x 3#  X 20           Biceps curls:B:  3#           Wall push ups with physioball             tband rows and extension:B: Green x  5 Blue x 20           tband ir and er:R:  Green x 20           Supine shoulder flexion with spc:B:  5# x 20           Triceps extension and scap punches:R  3# x 20           Right shoulder ER and abduction in left side lying  2# x 20           Prone Hughston's 1,2&5  NT Ther Activity                                       Gait Training                                       Modalities               CP PRN

## 2020-08-26 ENCOUNTER — ANNUAL EXAM (OUTPATIENT)
Dept: OBGYN CLINIC | Facility: CLINIC | Age: 45
End: 2020-08-26
Payer: COMMERCIAL

## 2020-08-26 VITALS
HEIGHT: 67 IN | WEIGHT: 149 LBS | TEMPERATURE: 98.2 F | BODY MASS INDEX: 23.39 KG/M2 | SYSTOLIC BLOOD PRESSURE: 130 MMHG | DIASTOLIC BLOOD PRESSURE: 92 MMHG

## 2020-08-26 DIAGNOSIS — Z12.39 BREAST CANCER SCREENING: ICD-10-CM

## 2020-08-26 DIAGNOSIS — Z01.419 WOMEN'S ANNUAL ROUTINE GYNECOLOGICAL EXAMINATION: Primary | ICD-10-CM

## 2020-08-26 PROCEDURE — S0612 ANNUAL GYNECOLOGICAL EXAMINA: HCPCS | Performed by: OBSTETRICS & GYNECOLOGY

## 2020-08-26 PROCEDURE — 3008F BODY MASS INDEX DOCD: CPT | Performed by: FAMILY MEDICINE

## 2020-08-26 NOTE — PATIENT INSTRUCTIONS
The patient was informed of a stable gyn examination  A Pap smear was not performed  She will continue to monitor her blood pressure  She continues arise she may seek the attention of her primary care physician  She will make arrange for mammogram   She return my office in 1 year

## 2020-08-26 NOTE — PROGRESS NOTES
Assessment/Plan:    The patient was informed of a stable gyn examination  A Pap smear was not performed  She does not getting menstrual cycle secondary to her history of endometrial ablation  There is no problem with intimacy  She is content with her weight  We did noted that her blood pressure was elevated today at 130/92  She will continue to watch stat home  She denies any headaches or blurred vision  She will make arrangements for mammogram   She should return my office in 1 year  She is content with her weight  There is no problem depression or anxiety  She is dealing well with COVID situation  She will continue see dentist on a regular basis  Subjective:      Patient ID: Ricardo Whitlock is a 45 y o  female  HPI    This is a 61-year-old white female, she is a  6 para 3 with 3 prior vaginal deliveries  She has a history of endometrial ablation  This is work well she is not getting menstrual cycle  She denies any signs symptoms of menopause  She is still sexually active  Her current method of contraception includes vasectomy  She denies any major  GI complaint  She is content with her weight  Denies any problem depression or anxiety  She has a dentist on a regular basis  There are no new major family illnesses report  We did note her blood pressure was elevated 130/92  She is dealing well with COVID situation  She feels safe at home  The following portions of the patient's history were reviewed and updated as appropriate: allergies, current medications, past family history, past medical history, past social history, past surgical history and problem list     Review of Systems   All other systems reviewed and are negative  Objective:      /92   Temp 98 2 °F (36 8 °C)   Ht 5' 7" (1 702 m)   Wt 67 6 kg (149 lb)   BMI 23 34 kg/m²          Physical Exam  Exam conducted with a chaperone present  Constitutional:       Appearance: Normal appearance   She is normal weight  HENT:      Head: Normocephalic and atraumatic  Neck:      Musculoskeletal: Normal range of motion and neck supple  Cardiovascular:      Rate and Rhythm: Normal rate and regular rhythm  Pulses: Normal pulses  Heart sounds: Normal heart sounds  Pulmonary:      Effort: Pulmonary effort is normal       Breath sounds: Normal breath sounds  Chest:      Breasts:         Right: Normal  No swelling, bleeding, inverted nipple or mass  Left: Normal  No swelling, bleeding, inverted nipple or mass  Abdominal:      General: Abdomen is flat  Bowel sounds are normal  There is no distension  Palpations: Abdomen is soft  There is no mass  Tenderness: There is no abdominal tenderness  There is no guarding or rebound  Hernia: No hernia is present  There is no hernia in the left inguinal area or right inguinal area  Genitourinary:     Pubic Area: No rash or pubic lice  Labia:         Right: No rash, tenderness, lesion or injury  Left: No rash, tenderness, lesion or injury  Urethra: No prolapse, urethral pain, urethral swelling or urethral lesion  Vagina: Normal  No signs of injury and foreign body  No vaginal discharge, erythema, tenderness, bleeding or prolapsed vaginal walls  Cervix: Normal       Uterus: Normal  Not deviated, not enlarged, not fixed, not tender and no uterine prolapse  Adnexa: Right adnexa normal and left adnexa normal         Right: No mass, tenderness or fullness  Left: No mass, tenderness or fullness  Rectum: Normal       Comments: The external genitalia within normal limits  The vagina is clean  The cervix is parous but closed  Uterus is anterior normal size  The bladder is well supported there is no prolapse  The adnexa clear bilaterally  There is no cervical motion tenderness  A Pap smear was not performed  Musculoskeletal: Normal range of motion  Skin:     General: Skin is warm  Neurological:      General: No focal deficit present  Mental Status: She is alert and oriented to person, place, and time  Psychiatric:         Mood and Affect: Mood normal          Behavior: Behavior normal          Thought Content:  Thought content normal

## 2020-08-27 ENCOUNTER — OFFICE VISIT (OUTPATIENT)
Dept: PHYSICAL THERAPY | Age: 45
End: 2020-08-27
Payer: COMMERCIAL

## 2020-08-27 DIAGNOSIS — M25.521 RIGHT ELBOW PAIN: Primary | ICD-10-CM

## 2020-08-27 PROCEDURE — 97110 THERAPEUTIC EXERCISES: CPT | Performed by: PHYSICAL THERAPIST

## 2020-08-27 NOTE — PROGRESS NOTES
Daily Note     Today's date: 2020  Patient name: Darshana Chan  : 1975  MRN: [id number]  Referring provider: Janina Guardado MD  Dx:   Encounter Diagnosis     ICD-10-CM    1  Right elbow pain  M25 521                   Subjective: Patient denies right ue symptoms with her ability to resume recreational activities without pain despite having another episode of short lived elbow region pain  Objective: See treatment diary below  Assessment: Tolerated treatment well  Patient demonstrated fatigue post treatment  Patient presents with facilitating bilateral RTC and postural strengthening overall her pain has reduced to only one episode over the past week  Thus, continue to promote and teach strengthening and postural correction and look to d/c to hep after next visit  Plan: Continue per plan of care  Precautions: Right elbow pain aggravation        Manuals           Kinesio taping in "Y" pattern for right GH joint with base inferior to deltoid tuberosity and tails at anterior and posterior deltoid at 25-50 % tension and additional "I" strip diagonal to base of "Y" at 25 % tension upward 10 min hold                                                  Neuro Re-Ed                                                                                                        Ther Ex             ube  4/4 minutes 4/4 minutes          Corner pec stretch:B: at 90 degrees  20 sec x 5 20 sec x 5          Scapular squeezes 3 sec x 5 3 sec x 20 3 sec x 30          Seated postural correction slough over correct  3 sec x 20 3 sec x 30          Cervical spine retraction  2 x 10 2 x 10          Wall slides shoulder flexion and scaption 5 x 2 x 10 2# x 20 each          Shoulder scaption and flexion:B: 5 x 3#  X 20 4# x 20          Biceps curls:B:  3# 5# x 20          Wall push ups with physioball             tband rows and extension:B: Green x  5 Blue x 20 Blue x 30          tband ir and er:R: Green x 20 Green x 30          Supine shoulder flexion with spc:B:  5# x 20 5# x 30          Triceps extension and scap punches:R  3# x 20 3# x 30          Right shoulder ER and abduction in left side lying  2# x 20 2# x 30          Prone Lori's 1,2&5  NT Prone I, t and y: x 20                                                                                                     Ther Activity                                       Gait Training                                       Modalities               CP PRN

## 2020-09-03 ENCOUNTER — OFFICE VISIT (OUTPATIENT)
Dept: PHYSICAL THERAPY | Age: 45
End: 2020-09-03
Payer: COMMERCIAL

## 2020-09-03 ENCOUNTER — EVALUATION (OUTPATIENT)
Dept: PHYSICAL THERAPY | Age: 45
End: 2020-09-03
Payer: COMMERCIAL

## 2020-09-03 DIAGNOSIS — M25.521 RIGHT ELBOW PAIN: Primary | ICD-10-CM

## 2020-09-03 PROCEDURE — 97750 PHYSICAL PERFORMANCE TEST: CPT | Performed by: PHYSICAL THERAPIST

## 2020-09-03 PROCEDURE — 97110 THERAPEUTIC EXERCISES: CPT

## 2020-09-03 NOTE — PROGRESS NOTES
Daily Note     Today's date: 9/3/2020  Patient name: Delinda Najjar  : 1975  MRN: [id number]  Referring provider: Nilda Thomas MD  Dx:   Encounter Diagnosis     ICD-10-CM    1  Right elbow pain  M25 521        Start Time: 345  Stop Time: 9856  Total time in clinic (min): 60 minutes    Subjective: No pain as per patient  Objective: See treatment diary below  Assessment: D/C to Hep  See re-eval for details       Plan: Continue per plan of care  Precautions: Right elbow pain aggravation        Manuals 8/12 8/19 8/27 9/3         Kinesio taping in "Y" pattern for right [address] joint with base inferior to deltoid tuberosity and tails at anterior and posterior deltoid at 25-50 % tension and additional "I" strip diagonal to base of "Y" at 25 % tension upward 10 min hold                        Ther Ex             ube  4/4 minutes 4/4 minutes 4/4 min          Corner pec stretch:B: at 90 degrees  20 sec x 5 20 sec x 5 20sec 5x         Scapular squeezes 3 sec x 5 3 sec x 20 3 sec x 30 30X 2SEC          Seated postural correction slough over correct  3 sec x 20 3 sec x 30 NT         Cervical spine retraction  2 x 10 2 x 10 20x          Wall slides shoulder flexion and scaption 5 x 2 x 10 2# x 20 each 20x each 2 5#          Shoulder scaption and flexion:B: 5 x 3#  X 20 4# x 20 4# 20x          Biceps curls:B:  3# 5# x 20 20x 5#          Wall push ups with physioball             tband rows and extension:B: Green x  5 Blue x 20 Blue x 30 BTB 30X          tband ir and er:R:  Green x 20 Green x 30 GTB   30X          Supine shoulder flexion with spc:B:  5# x 20 5# x 30 30X 5#          Triceps extension and scap punches:R  3# x 20 3# x 30 30X 3#          Right shoulder ER and abduction in left side lying  2# x 20 2# x 30 30X 2#          Prone Hughston's 1,2&5  NT Prone I, t and y: x 20 20X          Gait Training                          Modalities               CP PRN

## 2020-09-03 NOTE — PROGRESS NOTES
PT Evaluation / PT Reassessment / PT Discharge    Today's date: 9/3/2020  Patient name: Agapito Patel  : 1975  MRN: [id number]  Referring provider: Dennis Grande MD  Dx:   Encounter Diagnosis     ICD-10-CM    1  Right elbow pain  M25 521                   Assessment  Assessment details: PT Reassessment: 2020  Patient reported the following progress since onset of PT: no pain in right arm, movement improved, strength improved  Patient reported the following deficits that still persist: intermitent right medial elbow pain with activities  PT IE: 2020  Patient denies specific onset or cause of right elbow and hand symptoms this insidious nature of greater than 6 months  Patient noted specific location of medial right elbow joint and biceps region  Patient reported onset of right elbow pain that is a shooting pain with lifting  Patient noted she has right hand pain with right elbow pain that creates sensation of right hand weakness in which she will drop things  Patient noted she has stopped her lifting recreational activities due to right medial elbow and hand pain  Patient denies cervical spine movement reproducing right elbow and hand pain  Patient noted she is ambidextrous but writes with her left hand  Patient noted right elbow and hand pain has limited her fitness activities in which she noticed she has gotten weaker in right ue  Patient noted picking up and lifting items are limited by right medial elbow and hand pain  Patient noted only rest reduces her pain, but does not fully resolve her pain  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 with typing only intermittent deficits with mouse when pain aggravated  Patient denies diagnostic testing  Patient noted when aggravated twisting jars are difficult  Patient denies use of oral medication    Patient presents with right [facility] joint impingement syndrome reproducing right biceps and medial elbow due to compensation techniques  Impairments: abnormal or restricted ROM, abnormal movement, activity intolerance, lacks appropriate home exercise program and pain with function  Understanding of Dx/Px/POC: excellent   Prognosis: good  Prognosis details: Patient is a 45y o  year old female seen for outpatient PT evaluation with pain, mobility and functional deficits due to right elbow region pain  Patient presents with hte following progress since onset of PT: decrease in right ue pain, increase in right ue rom and strength, functional progress and abilities to resume all activities without pain limitations  Thus, due to impairment and functional progress patient agrees with PT POC to d/c to hep  Thus, patient provided with final hep and pt d/c to hep  Goals  Short Term goals 2 - 4 weeks  1  Patient will be independent HEP  MET  2   Patient will report a 25 - 50% decrease in pain complaints  MET  3   Increase strength 1/2 grade  MET  4   Increase ROM 5-10 degrees  MEET  Long Term goals 4 - 8 weeks  1  Patient will report elimination of pain complaints  MET  2   Patient will return to all work related activities without restriction  MET  3   Patient will return to all recreational activities without restriction  MET  4   ROM WFL  MET  5   Strength 5/5  MET  6   Patient will report ability to resume all fitness activities  MET  7   Patient will report she is able to open a jar without right ue pain  MET  8   Patient will report she no longer drops any items due to pain  MET  9   Patient will report she is able to reach out into abduction or across her body during functional activities without pain aggravation  MET  10   Patient will report being able to resume lifting and picking up items without pain limitations  MET      Plan  Patient would benefit from: skilled physical therapy  Planned modality interventions: cryotherapy, TENS, thermotherapy: hydrocollator packs and unattended electrical stimulation  Planned therapy interventions: manual therapy, massage, joint mobilization, body mechanics training, patient education, postural training, self care, strengthening, stretching, compression, therapeutic activities, therapeutic exercise, therapeutic training, flexibility, functional ROM exercises, graded activity, graded exercise, graded motor and home exercise program  Frequency: 1x week  Duration in weeks: 4  Treatment plan discussed with: patient        Subjective Evaluation    History of Present Illness  Mechanism of injury: Patient's PMHx is unremarkable  Pain  At best pain ratin  At worst pain rating: 3  Location: right medial elbow and hand pain    Patient Goals  Patient goals for therapy: return to sport/leisure activities, increased strength and decreased pain  Patient goal: Patient's goal is to resume resistance training without right elbow / biceps region pain  Objective     Tenderness     Additional Tenderness Details  Patient is - TTP at right medial epicondyle, posterior and lateral epicondyle regions  Patient exhibits protracted cervical spine posture at minimal levels      Active Range of Motion   Cervical/Thoracic Spine       Cervical    Flexion: 45 degrees   Extension: 40 degrees      Left lateral flexion: 40 degrees      Right lateral flexion: 45 degrees      Left rotation: 60 degrees  Right rotation: 62 degrees         Left Shoulder   Flexion: 162 degrees   Abduction: 172 degrees     Right Shoulder   Flexion: 164 degrees   Abduction: 170 degrees     Left Elbow   Flexion: 145 degrees   Extension: -2 degrees   Forearm supination: 95 degrees   Forearm pronation: 85 degrees     Right Elbow   Flexion: 148 degrees   Extension: -2 degrees   Forearm supination: 95 degrees   Forearm pronation: 85 degrees     Additional Active Range of Motion Details  Patient's active right shoulder abduction produced right biceps region pain: as per PT IE findings and now she is - for right shoulder movements and right biceps region pain aggravation  Strength/Myotome Testing     Left Shoulder     Planes of Motion   Flexion: 5   Abduction: 5   External rotation at 0°: 5   Internal rotation at 0°: 5     Right Shoulder     Planes of Motion   Flexion: 4+   Abduction: 5   External rotation at 0°: 5   Internal rotation at 0°: 5     Left Elbow   Flexion: 5  Extension: 5  Forearm supination: 5  Forearm pronation: 5    Right Elbow   Flexion: 5  Extension: 5  Forearm supination: 5  Forearm pronation: 5    Left Wrist/Hand   Wrist extension: 5  Wrist flexion: 5     (2nd hand position)     Trial 1: 52    Trial 2: 62    Trial 3: 56    Thumb Strength  Key/Lateral Pinch     Trial 1: 16    Trial 2: 16    Trial 3: 16    Average: 16    Right Wrist/Hand   Wrist extension: 5  Wrist flexion: 5     (2nd hand position)     Trial 1: 44    Trial 2: 48    Trial 3: 42    Thumb Strength   Key/Lateral Pinch     Trial 1: 17    Trial 2: 18    Trial 3: 17    Average: 17 33    Tests     Right Shoulder   Positive empty can and Hawkin's  Negative Yergason's  Right Elbow   Negative passive medial epicondylitis, valgus stress at 0 degrees, valgus stress at 30 degrees, varus stress at 0 degrees and varus stress at 30 degrees  Precautions: Right elbow pain aggravation

## 2020-09-03 NOTE — PROGRESS NOTES
PT Evaluation / PT Reassessment / PT Discharge    Today's date: 9/3/2020  Patient name: Jorge Layne  : 1975  MRN: [id number]  Referring provider: Horace qOuendo MD  Dx:   Encounter Diagnosis     ICD-10-CM    1  Right elbow pain  M25 521                   Assessment  Assessment details: PT Reassessment: 2020  Patient reported the following progress since onset of PT: no pain in right arm, movement improved, strength improved  Patient reported the following deficits that still persist: intermitent right medial elbow pain with activities  PT IE: 2020  Patient denies specific onset or cause of right elbow and hand symptoms this insidious nature of greater than 6 months  Patient noted specific location of medial right elbow joint and biceps region  Patient reported onset of right elbow pain that is a shooting pain with lifting  Patient noted she has right hand pain with right elbow pain that creates sensation of right hand weakness in which she will drop things  Patient noted she has stopped her lifting recreational activities due to right medial elbow and hand pain  Patient denies cervical spine movement reproducing right elbow and hand pain  Patient noted she is ambidextrous but writes with her left hand  Patient noted right elbow and hand pain has limited her fitness activities in which she noticed she has gotten weaker in right ue  Patient noted picking up and lifting items are limited by right medial elbow and hand pain  Patient noted only rest reduces her pain, but does not fully resolve her pain  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 with typing only intermittent deficits with mouse when pain aggravated  Patient denies diagnostic testing  Patient noted when aggravated twisting jars are difficult  Patient denies use of oral medication    Patient presents with right [facility] joint impingement syndrome reproducing right biceps and medial elbow due to compensation techniques  Impairments: abnormal or restricted ROM, abnormal movement, activity intolerance, lacks appropriate home exercise program and pain with function  Understanding of Dx/Px/POC: excellent   Prognosis: good  Prognosis details: Patient is a 45y o  year old female seen for outpatient PT evaluation with pain, mobility and functional deficits due to right elbow region pain  Patient presents to PT IE with the following problems, concerns, deficits and impairments: right medial elbow / biceps region pain, decreased right ue range of motion, decreased right ue strength, decrease in postural awareness, + special tests, functional limitations and decreased tolerance to activity  Patient would benefit from skilled PT services under the following PT treatment plan to address the above noted deficits: therapeutic exercises and activities to facilitate right ue rom and strength, modalities, manual therapy techniques, postural reeducation and strengthening, IASTM techniques ,kinesio taping techniques and a hep  Thank you for the referral      Goals  Short Term goals 2 - 4 weeks  1  Patient will be independent HEP  MET  2   Patient will report a 25 - 50% decrease in pain complaints  MET  3   Increase strength 1/2 grade  MET  4   Increase ROM 5-10 degrees  MEET  Long Term goals 4 - 8 weeks  1  Patient will report elimination of pain complaints  MET  2   Patient will return to all work related activities without restriction  MET  3   Patient will return to all recreational activities without restriction  MET  4   ROM WFL  MET  5   Strength 5/5  MET  6   Patient will report ability to resume all fitness activities  MET  7   Patient will report she is able to open a jar without right ue pain  MET  8   Patient will report she no longer drops any items due to pain  MET    9   Patient will report she is able to reach out into abduction or across her body during functional activities without pain aggravation  MET  10   Patient will report being able to resume lifting and picking up items without pain limitations  MET  PT Reassessment: 2020  Patient reported the following progress since onset of PT: no pain in right arm, movement improved, strength improved  Patient reported the following deficits that still persist: intermitent right medial elbow pain with activities  Plan  Patient would benefit from: skilled physical therapy  Planned modality interventions: cryotherapy, TENS, thermotherapy: hydrocollator packs and unattended electrical stimulation  Planned therapy interventions: manual therapy, massage, joint mobilization, body mechanics training, patient education, postural training, self care, strengthening, stretching, compression, therapeutic activities, therapeutic exercise, therapeutic training, flexibility, functional ROM exercises, graded activity, graded exercise, graded motor and home exercise program  Frequency: 1x week  Duration in weeks: 4  Treatment plan discussed with: patient        Subjective Evaluation    History of Present Illness  Mechanism of injury: Patient's PMHx is unremarkable  Pain  At best pain ratin  At worst pain rating: 3  Location: right medial elbow and hand pain    Patient Goals  Patient goals for therapy: return to sport/leisure activities, increased strength and decreased pain  Patient goal: Patient's goal is to resume resistance training without right elbow / biceps region pain

## 2020-11-09 ENCOUNTER — OFFICE VISIT (OUTPATIENT)
Dept: FAMILY MEDICINE CLINIC | Facility: CLINIC | Age: 45
End: 2020-11-09
Payer: COMMERCIAL

## 2020-11-09 VITALS
HEART RATE: 49 BPM | SYSTOLIC BLOOD PRESSURE: 120 MMHG | TEMPERATURE: 98 F | DIASTOLIC BLOOD PRESSURE: 82 MMHG | OXYGEN SATURATION: 98 % | WEIGHT: 148 LBS | BODY MASS INDEX: 23.23 KG/M2 | HEIGHT: 67 IN

## 2020-11-09 DIAGNOSIS — H93.231 HEARING ABNORMALLY ACUTE, RIGHT: Primary | ICD-10-CM

## 2020-11-09 PROCEDURE — 99214 OFFICE O/P EST MOD 30 MIN: CPT | Performed by: FAMILY MEDICINE

## 2020-11-09 PROCEDURE — 3008F BODY MASS INDEX DOCD: CPT | Performed by: FAMILY MEDICINE

## 2020-11-20 ENCOUNTER — HOSPITAL ENCOUNTER (OUTPATIENT)
Dept: RADIOLOGY | Facility: IMAGING CENTER | Age: 45
Discharge: HOME | End: 2020-11-20
Payer: COMMERCIAL

## 2020-11-20 DIAGNOSIS — H93.231 HEARING ABNORMALLY ACUTE, RIGHT: ICD-10-CM

## 2020-11-20 PROCEDURE — 70553 MRI BRAIN STEM W/O & W/DYE: CPT

## 2020-11-20 PROCEDURE — G1004 CDSM NDSC: HCPCS

## 2020-11-20 PROCEDURE — A9585 GADOBUTROL INJECTION: HCPCS | Performed by: FAMILY MEDICINE

## 2020-11-20 RX ADMIN — GADOBUTROL 6 ML: 604.72 INJECTION INTRAVENOUS at 11:18

## 2021-01-04 ENCOUNTER — OFFICE VISIT (OUTPATIENT)
Dept: FAMILY MEDICINE CLINIC | Facility: CLINIC | Age: 46
End: 2021-01-04
Payer: COMMERCIAL

## 2021-01-04 VITALS
WEIGHT: 151 LBS | DIASTOLIC BLOOD PRESSURE: 78 MMHG | TEMPERATURE: 98 F | OXYGEN SATURATION: 97 % | HEART RATE: 58 BPM | SYSTOLIC BLOOD PRESSURE: 120 MMHG | HEIGHT: 67 IN | BODY MASS INDEX: 23.7 KG/M2

## 2021-01-04 DIAGNOSIS — H93.231 HEARING ABNORMALLY ACUTE, RIGHT: Primary | ICD-10-CM

## 2021-01-04 DIAGNOSIS — G52.9 CRANIAL NERVE DYSFUNCTION: ICD-10-CM

## 2021-01-04 DIAGNOSIS — R20.2 PARESTHESIA: ICD-10-CM

## 2021-01-04 DIAGNOSIS — R21 SKIN RASH: ICD-10-CM

## 2021-01-04 PROCEDURE — 3008F BODY MASS INDEX DOCD: CPT | Performed by: FAMILY MEDICINE

## 2021-01-04 PROCEDURE — 99214 OFFICE O/P EST MOD 30 MIN: CPT | Performed by: FAMILY MEDICINE

## 2021-01-04 RX ORDER — MOMETASONE FUROATE 1 MG/ML
SOLUTION TOPICAL
COMMUNITY
Start: 2020-12-29

## 2021-01-04 RX ORDER — VALACYCLOVIR HYDROCHLORIDE 1 G/1
1000 TABLET, FILM COATED ORAL 3 TIMES DAILY
Qty: 21 TABLET | Refills: 0 | Status: SHIPPED | OUTPATIENT
Start: 2021-01-04 | End: 2021-01-11

## 2021-01-04 NOTE — ASSESSMENT & PLAN NOTE
Despite atypical appearance, with eye involvement shingles is in my concerning differential diagnosis  I've ordered valacyclovir and asked patient to connect with her eye doctor  Not currently affecting conjunctiva or vision  Will follow closely

## 2021-01-04 NOTE — PROGRESS NOTES
Family Medicine Follow-Up Office Visit  Elsa Sims 39 y o  female   MRN: [id number] : 1975  ENCOUNTER: 2021 4:28 PM    Assessment and Plan   Hearing abnormally acute, right  Due to persistent humming sound in R ear despite normal imaging and hearing tests, will check labs (CBC, CMP, TSH, B12) and refer to neurology  May need EEG(?) vs consideration of other evaluation  Skin rash  Despite atypical appearance, with eye involvement shingles is in my concerning differential diagnosis  I've ordered valacyclovir and asked patient to connect with her eye doctor  Not currently affecting conjunctiva or vision  Will follow closely  RTC 1mo    Chief Complaint     Chief Complaint   Patient presents with    Follow-up       History of Present Illness   Elsa Sims is a 39y o -year-old female who presents today for followup of humming in R ear (only)  Recent hearing test normal, ENT team did not recommend additional testing but recommended Epley's maneuvers  Started with small lumps/bumps on L side of scalp and forehead one week ago  Gets shooting pain in one on the L eye  Also notes some paresthesias  Scalp bumps are sore to touch  No change in vision  Has psoriasis on scalp  No neurological prodrome before this started  No fever, chills  Had chickenpox as a child  Review of Systems   Review of Systems   Constitutional: Negative for activity change, chills, fatigue and fever  HENT: Negative for congestion, sinus pressure, sinus pain and sore throat  Respiratory: Negative for cough, shortness of breath and wheezing  Cardiovascular: Negative for chest pain, palpitations and leg swelling  Gastrointestinal: Negative for abdominal pain, diarrhea, nausea and vomiting  Genitourinary: Negative for decreased urine volume, dysuria, frequency and urgency  Musculoskeletal: Negative for arthralgias, myalgias, neck pain and neck stiffness  Skin: Negative for rash          Bumps on skin   Neurological: Positive for dizziness  Negative for light-headedness, numbness and headaches  Tinnitus         Active Problem List     Patient Active Problem List   Diagnosis    Left breast lump    Seasonal allergies    Right elbow pain    Acute dysfunction of right eustachian tube    Hearing abnormally acute, right    Skin rash       Past Medical History, Past Surgical History, Family History, and Social History were reviewed and updated today as appropriate  Objective   /78   Pulse 58   Temp 98 °F (36 7 °C)   Ht 5' 7" (1 702 m)   Wt 68 5 kg (151 lb)   SpO2 97%   BMI 23 65 kg/m²     Physical Exam  Constitutional:       General: She is not in acute distress  Appearance: She is well-developed  HENT:      Head: Normocephalic and atraumatic  Eyes:      General: No scleral icterus  Right eye: No discharge  Left eye: No discharge  Conjunctiva/sclera: Conjunctivae normal       Pupils: Pupils are equal, round, and reactive to light  Neck:      Musculoskeletal: Normal range of motion and neck supple  Cardiovascular:      Rate and Rhythm: Normal rate and regular rhythm  Heart sounds: Normal heart sounds  No murmur  No friction rub  No gallop  Pulmonary:      Effort: Pulmonary effort is normal  No respiratory distress  Breath sounds: Normal breath sounds  No wheezing or rales  Abdominal:      General: There is no distension  Palpations: Abdomen is soft  Musculoskeletal: Normal range of motion  Skin:     Comments: L forehead with cluster of small papules without fluid collection  1-2 small lesions along border of eyelid  No open spots  Neurological:      Mental Status: She is alert and oriented to person, place, and time  Psychiatric:         Behavior: Behavior normal          Thought Content:  Thought content normal        Diabetic Foot Exam    Pertinent Laboratory/Diagnostic Studies:  No results found for: GLUCOSE, BUN, CREATININE, CALCIUM, NA, K, CO2, CL  No results found for: ALT, AST, GGT, ALKPHOS, BILITOT    No results found for: WBC, HGB, HCT, MCV, PLT    No results found for: TSH    No results found for: CHOL  No results found for: TRIG  No results found for: HDL  No results found for: LDLCALC  No results found for: HGBA1C    Results for orders placed or performed in visit on 04/22/19   HPV High Risk    Specimen: Cervix; Thin-Prep Vial   Result Value Ref Range    HPV Other HR Negative Negative    HPV16 Negative Negative    HPV18 Negative Negative   Liquid-based pap, screening   Result Value Ref Range    Case Report       Gynecologic Cytology Report                       Case: [id number]                                  Authorizing Provider:  Louisa Phillips MD         Collected:           04/22/2019 0537              Ordering Location:     [facility]      Received:            04/22/2019 1634              First Screen:          Nasrin Carrillo                                                                  Specimen:    LIQUID-BASED PAP, SCREENING, Cervix                                                        Primary Interpretation Negative for intraepithelial lesion or malignancy     Specimen Adequacy       Satisfactory for evaluation  Endocervical/transformation zone component present  Additional Information       [employer]'s FDA approved ,  and ThinPrep Imaging Duo System are utilized with strict adherence to the 's instruction manual to prepare gynecologic and non-gynecologic cytology specimens for the production of ThinPrep slides as well as for gynecologic ThinPrep imaging  These processes have been validated by our laboratory and/or by the   The Pap test is not a diagnostic procedure and should not be used as the sole means to detect cervical cancer  It is only a screening procedure to aid in the detection of cervical cancer and its precursors   Both false-negative and false-positive results have been experienced  Your patient's test result should be interpreted in this context together with the history and clinical findings  LMP         Orders Placed This Encounter   Procedures    CBC and differential    TSH, 3rd generation with Free T4 reflex    Comprehensive metabolic panel    Vitamin B12    Ambulatory referral to Neurology         Current Medications     Current Outpatient Medications   Medication Sig Dispense Refill    clotrimazole-betamethasone (LOTRISONE) 1-0 05 % cream Apply topically      Multiple Vitamin (MULTIVITAMIN) tablet Take 1 tablet by mouth daily      mometasone (ELOCON) 0 1 % lotion APPLY TO SCALP TWO TIMES DAILY FOR 2 WEEKS THEN ONCE EVERY DAY FOR 1 WEEK THEN ON WEEKENDS AS DIRECTED      valACYclovir (VALTREX) 1,000 mg tablet Take 1 tablet (1,000 mg total) by mouth 3 (three) times a day for 7 days 21 tablet 0     No current facility-administered medications for this visit          ALLERGIES:  Allergies   Allergen Reactions    Penicillins     Doxycycline Hives    Penicillin G Rash       Health Maintenance     Health Maintenance   Topic Date Due    HIV Screening  10/15/1992    DTaP,Tdap,and Td Vaccines (1 - Tdap) 10/15/1998    PT PLAN OF CARE  10/03/2020    MAMMOGRAM  05/29/2021    Depression Screening PHQ  08/07/2021    Annual Physical  08/26/2021    BMI: Adult  01/04/2022    Cervical Cancer Screening  04/22/2024    Influenza Vaccine  Completed    Pneumococcal Vaccine: Pediatrics (0 to 5 Years) and At-Risk Patients (6 to 59 Years)  Aged Out    HIB Vaccine  Aged Out    Hepatitis B Vaccine  Aged Out    IPV Vaccine  Aged Out    Hepatitis A Vaccine  Aged Out    Meningococcal ACWY Vaccine  Aged Out    HPV Vaccine  Aged [employer] History   Administered Date(s) Administered    INFLUENZA 10/01/2007, 11/08/2018    Influenza Injectable, MDCK, Preservative Free, Quadrivalent, 0 5 mL 10/30/2020         Ginny Mijares MD   [facility] [address] [facility]  1/4/2021  4:28 PM    Parts of this note were dictated using [employer] dictation software and may have sounds-like errors due to variation in pronunciation

## 2021-01-05 ENCOUNTER — LAB (OUTPATIENT)
Dept: LAB | Age: 46
End: 2021-01-05
Payer: COMMERCIAL

## 2021-01-05 DIAGNOSIS — H93.231 HEARING ABNORMALLY ACUTE, RIGHT: ICD-10-CM

## 2021-01-05 LAB
ALBUMIN SERPL BCP-MCNC: 4.2 G/DL (ref 3.5–5)
ALP SERPL-CCNC: 53 U/L (ref 46–116)
ALT SERPL W P-5'-P-CCNC: 30 U/L (ref 12–78)
ANION GAP SERPL CALCULATED.3IONS-SCNC: 4 MMOL/L (ref 4–13)
AST SERPL W P-5'-P-CCNC: 23 U/L (ref 5–45)
BASOPHILS # BLD AUTO: 0.05 THOUSANDS/ΜL (ref 0–0.1)
BASOPHILS NFR BLD AUTO: 1 % (ref 0–1)
BILIRUB SERPL-MCNC: 0.71 MG/DL (ref 0.2–1)
BUN SERPL-MCNC: 17 MG/DL (ref 5–25)
CALCIUM SERPL-MCNC: 9 MG/DL (ref 8.3–10.1)
CHLORIDE SERPL-SCNC: 103 MMOL/L (ref 100–108)
CO2 SERPL-SCNC: 28 MMOL/L (ref 21–32)
CREAT SERPL-MCNC: 0.94 MG/DL (ref 0.6–1.3)
EOSINOPHIL # BLD AUTO: 0.14 THOUSAND/ΜL (ref 0–0.61)
EOSINOPHIL NFR BLD AUTO: 2 % (ref 0–6)
ERYTHROCYTE [DISTWIDTH] IN BLOOD BY AUTOMATED COUNT: 11.8 % (ref 11.6–15.1)
GFR SERPL CREATININE-BSD FRML MDRD: 75 ML/MIN/1.73SQ M
GLUCOSE SERPL-MCNC: 77 MG/DL (ref 65–140)
HCT VFR BLD AUTO: 40.7 % (ref 34.8–46.1)
HGB BLD-MCNC: 13.9 G/DL (ref 11.5–15.4)
IMM GRANULOCYTES # BLD AUTO: 0.01 THOUSAND/UL (ref 0–0.2)
IMM GRANULOCYTES NFR BLD AUTO: 0 % (ref 0–2)
LYMPHOCYTES # BLD AUTO: 1.59 THOUSANDS/ΜL (ref 0.6–4.47)
LYMPHOCYTES NFR BLD AUTO: 24 % (ref 14–44)
MCH RBC QN AUTO: 30.5 PG (ref 26.8–34.3)
MCHC RBC AUTO-ENTMCNC: 34.2 G/DL (ref 31.4–37.4)
MCV RBC AUTO: 90 FL (ref 82–98)
MONOCYTES # BLD AUTO: 0.48 THOUSAND/ΜL (ref 0.17–1.22)
MONOCYTES NFR BLD AUTO: 7 % (ref 4–12)
NEUTROPHILS # BLD AUTO: 4.31 THOUSANDS/ΜL (ref 1.85–7.62)
NEUTS SEG NFR BLD AUTO: 66 % (ref 43–75)
NRBC BLD AUTO-RTO: 0 /100 WBCS
PLATELET # BLD AUTO: 261 THOUSANDS/UL (ref 149–390)
PMV BLD AUTO: 11.4 FL (ref 8.9–12.7)
POTASSIUM SERPL-SCNC: 4.1 MMOL/L (ref 3.5–5.3)
PROT SERPL-MCNC: 7.5 G/DL (ref 6.4–8.2)
RBC # BLD AUTO: 4.55 MILLION/UL (ref 3.81–5.12)
SODIUM SERPL-SCNC: 135 MMOL/L (ref 136–145)
TSH SERPL DL<=0.05 MIU/L-ACNC: 2.07 UIU/ML (ref 0.36–3.74)
VIT B12 SERPL-MCNC: 1211 PG/ML (ref 100–900)
WBC # BLD AUTO: 6.58 THOUSAND/UL (ref 4.31–10.16)

## 2021-01-05 PROCEDURE — 80053 COMPREHEN METABOLIC PANEL: CPT

## 2021-01-05 PROCEDURE — 85025 COMPLETE CBC W/AUTO DIFF WBC: CPT

## 2021-01-05 PROCEDURE — 36415 COLL VENOUS BLD VENIPUNCTURE: CPT

## 2021-01-05 PROCEDURE — 84443 ASSAY THYROID STIM HORMONE: CPT

## 2021-01-05 PROCEDURE — 82607 VITAMIN B-12: CPT

## 2021-01-19 ENCOUNTER — TELEPHONE (OUTPATIENT)
Dept: NEUROLOGY | Facility: CLINIC | Age: 46
End: 2021-01-19

## 2021-01-19 NOTE — TELEPHONE ENCOUNTER
Best contact number for [facility]:[phone]    Emergency Contact name and number:None    Referring provider and telephone number:Rick Schuster    Primary Care Provider Name and if affiliated with Lindsay Schuster     Reason for Appointment/Dx:Cranial nerve dysfunction    Neurology Location patient would like to be seen:[facility] received? Yes                                                 Records Received? No     Have you ever seen another Neurologist?       No     Insurance Information    Insurance Name:[employer]     ID/Policy #:    Secondary Insurance:    ID/Policy#: Workman's Comp/ Accident/ School  Information      Workman's Comp/Accident/School related?        None     If yes name of Insurance company:    Date of Injury:    Type of Injury:    [address] Name and Telephone Number:    Notes:Appointment schedule with patient new patient pack sent                    Appointment date: 7-7-2021 1:30pm with Dr Tricia Tucker

## 2021-03-31 DIAGNOSIS — Z23 ENCOUNTER FOR IMMUNIZATION: ICD-10-CM

## 2021-04-09 ENCOUNTER — TELEPHONE (OUTPATIENT)
Dept: NEUROLOGY | Facility: CLINIC | Age: 46
End: 2021-04-09

## 2021-04-09 NOTE — TELEPHONE ENCOUNTER
1st Attempt to reschedule appointment (NP) patient is currently scheduled with Dr Joelle Salazar for 10/13 but Dr Sadi Malloy will not be in the office - looking to reschedule with residency/PGY2 at the soonest convenience

## 2021-04-09 NOTE — TELEPHONE ENCOUNTER
Patient called back to reschedule I have scheduled patient for 7/27 @ 9:30 with Dr Yenny Oconnell in Residency - new appt reminder card sent

## 2021-05-24 ENCOUNTER — TELEPHONE (OUTPATIENT)
Dept: NEUROLOGY | Facility: CLINIC | Age: 46
End: 2021-05-24

## 2021-05-24 NOTE — TELEPHONE ENCOUNTER
lvm for patient to schedule sooner appt - availability is with Dr Laisha Benjamin at [facility] on 5/25 @ 10a - please assist if still available

## 2021-06-02 ENCOUNTER — TELEPHONE (OUTPATIENT)
Dept: NEUROLOGY | Facility: CLINIC | Age: 46
End: 2021-06-02

## 2021-06-04 ENCOUNTER — HOSPITAL ENCOUNTER (OUTPATIENT)
Dept: RADIOLOGY | Age: 46
Discharge: HOME | End: 2021-06-04
Payer: COMMERCIAL

## 2021-06-04 VITALS — HEIGHT: 67 IN | BODY MASS INDEX: 23.7 KG/M2 | WEIGHT: 151 LBS

## 2021-06-04 DIAGNOSIS — Z12.31 ENCOUNTER FOR SCREENING MAMMOGRAM FOR MALIGNANT NEOPLASM OF BREAST: ICD-10-CM

## 2021-06-04 DIAGNOSIS — Z12.39 BREAST CANCER SCREENING: ICD-10-CM

## 2021-06-04 PROCEDURE — 77063 BREAST TOMOSYNTHESIS BI: CPT

## 2021-06-04 PROCEDURE — 77067 SCR MAMMO BI INCL CAD: CPT

## 2021-06-25 ENCOUNTER — TELEPHONE (OUTPATIENT)
Dept: NEUROLOGY | Facility: CLINIC | Age: 46
End: 2021-06-25

## 2021-06-25 NOTE — TELEPHONE ENCOUNTER
Left message for patient to call the office to scheduled the appointment on 7/27/21 with Dr Valentina Barrow since she will be out of the office  Asking for a call back

## 2021-07-22 ENCOUNTER — TELEPHONE (OUTPATIENT)
Dept: NEUROLOGY | Facility: CLINIC | Age: 46
End: 2021-07-22

## 2021-07-22 NOTE — LETTER
July 23, 2021      Kiesha Sky  [address] [facility] [address] [facility]    Dear Ms  Nina Link office has been unsuccessful in trying to reach you by phone regarding:    ? Office appointment       Please contact our office at your earliest convenience  Please call [phone]  and follow the prompts        Sincerely,      [facility]

## 2021-07-22 NOTE — TELEPHONE ENCOUNTER
[facility] for pt to call to R/S appt as Dr Claudio Estrella will not be in office on 7/27/2021

## 2021-08-10 ENCOUNTER — ANNUAL EXAM (OUTPATIENT)
Dept: OBGYN CLINIC | Facility: CLINIC | Age: 46
End: 2021-08-10
Payer: COMMERCIAL

## 2021-08-10 VITALS
SYSTOLIC BLOOD PRESSURE: 118 MMHG | DIASTOLIC BLOOD PRESSURE: 80 MMHG | HEIGHT: 67 IN | WEIGHT: 147 LBS | BODY MASS INDEX: 23.07 KG/M2

## 2021-08-10 DIAGNOSIS — Z01.419 WOMEN'S ANNUAL ROUTINE GYNECOLOGICAL EXAMINATION: ICD-10-CM

## 2021-08-10 DIAGNOSIS — Z12.31 ENCOUNTER FOR SCREENING MAMMOGRAM FOR MALIGNANT NEOPLASM OF BREAST: Primary | ICD-10-CM

## 2021-08-10 PROCEDURE — S0612 ANNUAL GYNECOLOGICAL EXAMINA: HCPCS | Performed by: OBSTETRICS & GYNECOLOGY

## 2021-08-10 NOTE — PROGRESS NOTES
Assessment/Plan:     the patient was informed of a stable gyn examination  A Pap smear was not performed  She is content with her weight  There is no problem with intimacy  There is no problem depression or anxiety  She sees her dentist on a regular basis  She denies any problems with depression or anxiety and she feels very safe at home  She should return to my office in 1 year         Subjective:      Patient ID: Liana Mom is a 39 y o  female  HPI      This is a 77-year-old white female, she is a  6 para 3 with 3 living children  Her current method of contraception includes vasectomy  She is not getting any menstrual cycles because of a history of endometrial ablation  Denies any true symptoms of menopause  Still sexually active  Denies any major  GI complaint  Mammograms up-to-date  She is happy with her weight  She feels safe at home  She has received COVID vac seen  There are no new major family illnesses report at this time  She sees a dentist on a regular basis  The following portions of the patient's history were reviewed and updated as appropriate: allergies, current medications, past family history, past medical history, past social history, past surgical history and problem list     Review of Systems   All other systems reviewed and are negative  Objective:      /80   Ht 5' 7" (1 702 m)   Wt 66 7 kg (147 lb)   BMI 23 02 kg/m²          Physical Exam  Vitals reviewed  Exam conducted with a chaperone present  Constitutional:       Appearance: Normal appearance  She is normal weight  Eyes:      Extraocular Movements: Extraocular movements intact  Cardiovascular:      Rate and Rhythm: Normal rate and regular rhythm  Pulses: Normal pulses  Heart sounds: Normal heart sounds  Pulmonary:      Effort: Pulmonary effort is normal       Breath sounds: Normal breath sounds     Chest:      Breasts:         Right: Normal  No swelling, bleeding, inverted nipple, mass, nipple discharge or skin change  Left: Normal  No swelling, bleeding, inverted nipple or mass  Abdominal:      General: Abdomen is flat  Bowel sounds are normal  There is no distension or abdominal bruit  Palpations: There is no hepatomegaly or splenomegaly  Hernia: There is no hernia in the umbilical area, ventral area, left inguinal area or right inguinal area  Genitourinary:     General: Normal vulva  Labia:         Right: No rash, tenderness, lesion or injury  Left: No rash, tenderness, lesion or injury  Urethra: No prolapse, urethral pain, urethral swelling or urethral lesion  Vagina: Normal  No signs of injury and foreign body  No vaginal discharge, erythema, tenderness, bleeding, lesions or prolapsed vaginal walls  Cervix: Normal       Uterus: Normal  Not deviated, not enlarged, not fixed, not tender and no uterine prolapse  Adnexa: Right adnexa normal and left adnexa normal         Right: No mass or tenderness  Left: No mass or tenderness  Rectum: Normal       Comments: The external genitalia normal limits the vagina is clean the cervix is parous but closed  The adnexa clear bilaterally  There is no cervical motion tenderness  A Pap smear was not performed  There is no prolapse of the bladder or the uterus or the  Urethra  Musculoskeletal:         General: Normal range of motion  Cervical back: Normal range of motion and neck supple  Skin:     General: Skin is warm and dry  Neurological:      General: No focal deficit present  Mental Status: She is alert and oriented to person, place, and time     Psychiatric:         Mood and Affect: Mood normal          Behavior: Behavior normal

## 2021-08-10 NOTE — PATIENT INSTRUCTIONS
The patient was informed of a stable gyn examination  A Pap smear was not performed  There are no other issues  She is happy with her weight  She will continue to get yearly mammograms  She should return to my office in 1 year unless new problems arise

## 2022-06-20 ENCOUNTER — HOSPITAL ENCOUNTER (OUTPATIENT)
Dept: RADIOLOGY | Age: 47
Discharge: HOME | End: 2022-06-20
Payer: COMMERCIAL

## 2022-06-20 VITALS — HEIGHT: 67 IN | BODY MASS INDEX: 24.33 KG/M2 | WEIGHT: 155 LBS

## 2022-06-20 DIAGNOSIS — Z12.31 ENCOUNTER FOR SCREENING MAMMOGRAM FOR MALIGNANT NEOPLASM OF BREAST: ICD-10-CM

## 2022-06-20 PROCEDURE — 77063 BREAST TOMOSYNTHESIS BI: CPT

## 2022-06-20 PROCEDURE — 77067 SCR MAMMO BI INCL CAD: CPT

## 2022-08-10 ENCOUNTER — ANNUAL EXAM (OUTPATIENT)
Dept: OBGYN CLINIC | Facility: CLINIC | Age: 47
End: 2022-08-10
Payer: COMMERCIAL

## 2022-08-10 VITALS
SYSTOLIC BLOOD PRESSURE: 112 MMHG | BODY MASS INDEX: 24.2 KG/M2 | DIASTOLIC BLOOD PRESSURE: 76 MMHG | WEIGHT: 154.2 LBS | HEIGHT: 67 IN

## 2022-08-10 DIAGNOSIS — Z01.419 WOMEN'S ANNUAL ROUTINE GYNECOLOGICAL EXAMINATION: Primary | ICD-10-CM

## 2022-08-10 DIAGNOSIS — Z12.31 ENCOUNTER FOR SCREENING MAMMOGRAM FOR MALIGNANT NEOPLASM OF BREAST: ICD-10-CM

## 2022-08-10 PROCEDURE — G0145 SCR C/V CYTO,THINLAYER,RESCR: HCPCS | Performed by: OBSTETRICS & GYNECOLOGY

## 2022-08-10 PROCEDURE — S0612 ANNUAL GYNECOLOGICAL EXAMINA: HCPCS | Performed by: OBSTETRICS & GYNECOLOGY

## 2022-08-10 PROCEDURE — G0476 HPV COMBO ASSAY CA SCREEN: HCPCS | Performed by: OBSTETRICS & GYNECOLOGY

## 2022-08-10 NOTE — PROGRESS NOTES
Assessment/Plan:    The patient was informed of a stable gyn examination  No signs of menopause  No problem bladder control  No problem intimacy  She is happy with her weight  She will continue get yearly mammograms  She return my office in 1 year  Subjective:      Patient ID: Jorge Layne is a 42 y o  female  HPI  This is a 74-year-old white female, she is a  6 para 3 with 3 prior vaginal deliveries  Her current method of contraception includes vasectomy  She no longer gets menstrual cycles secondary to a history of endometrial ablation  There is no signs symptoms of menopause  She is happy with her weight  She feels safe at home  She has a dentist on a regular basis  Denies any problem depression or anxiety  She is content with her weight  There are no new major family illnesses report  She has received COVID vaccine  She continue to get yearly mammograms  The following portions of the patient's history were reviewed and updated as appropriate: allergies, current medications, past family history, past medical history, past social history, past surgical history and problem list     Review of Systems   HENT: Negative for rhinorrhea  All other systems reviewed and are negative  Objective:      /76   Ht 5' 7" (1 702 m)   Wt 69 9 kg (154 lb 3 2 oz)   BMI 24 15 kg/m²          Physical Exam  Vitals reviewed  Exam conducted with a chaperone present  Constitutional:       Appearance: Normal appearance  HENT:      Head: Normocephalic and atraumatic  Mouth/Throat:      Mouth: Mucous membranes are moist    Eyes:      Extraocular Movements: Extraocular movements intact  Cardiovascular:      Rate and Rhythm: Normal rate and regular rhythm  Pulses: Normal pulses  Heart sounds: Normal heart sounds  Pulmonary:      Effort: Pulmonary effort is normal       Breath sounds: Normal breath sounds  Abdominal:      General: Abdomen is flat   Bowel sounds are normal  There is no distension  Palpations: Abdomen is soft  There is no hepatomegaly or splenomegaly  Hernia: No hernia is present  There is no hernia in the left inguinal area or right inguinal area  Genitourinary:     General: Normal vulva  Pubic Area: No rash or pubic lice  Labia:         Right: No rash, tenderness, lesion or injury  Left: No rash, tenderness, lesion or injury  Urethra: No prolapse or urethral pain  Vagina: Normal  No signs of injury and foreign body  No vaginal discharge, erythema, tenderness, bleeding, lesions or prolapsed vaginal walls  Cervix: Normal       Uterus: Normal  Not deviated, not enlarged, not fixed, not tender and no uterine prolapse  Adnexa: Right adnexa normal and left adnexa normal         Right: No mass or tenderness  Left: No mass or tenderness  Rectum: Normal       Comments: External genitalia normal limits the vagina is clean the cervix parous but closed uterus is anterior normal size there is no cervical motion tenderness  A Pap smear was performed  There was no evidence of prolapse  Urethra bladder normal appearance and consistency  Musculoskeletal:         General: Normal range of motion  Cervical back: Normal range of motion and neck supple  Skin:     General: Skin is warm and dry  Neurological:      General: No focal deficit present  Mental Status: She is alert and oriented to person, place, and time  Psychiatric:         Mood and Affect: Mood normal          Behavior: Behavior normal          Thought Content:  Thought content normal

## 2022-08-15 LAB
LAB AP GYN PRIMARY INTERPRETATION: NORMAL
Lab: NORMAL

## 2022-08-16 ENCOUNTER — OFFICE VISIT (OUTPATIENT)
Dept: FAMILY MEDICINE CLINIC | Facility: CLINIC | Age: 47
End: 2022-08-16
Payer: COMMERCIAL

## 2022-08-16 ENCOUNTER — APPOINTMENT (OUTPATIENT)
Dept: LAB | Age: 47
End: 2022-08-16
Payer: COMMERCIAL

## 2022-08-16 VITALS
WEIGHT: 154.38 LBS | DIASTOLIC BLOOD PRESSURE: 78 MMHG | BODY MASS INDEX: 24.23 KG/M2 | TEMPERATURE: 97.7 F | OXYGEN SATURATION: 99 % | HEIGHT: 67 IN | HEART RATE: 63 BPM | SYSTOLIC BLOOD PRESSURE: 118 MMHG | RESPIRATION RATE: 20 BRPM

## 2022-08-16 DIAGNOSIS — Z11.59 NEED FOR HEPATITIS C SCREENING TEST: ICD-10-CM

## 2022-08-16 DIAGNOSIS — R21 RASH: ICD-10-CM

## 2022-08-16 DIAGNOSIS — R21 RASH: Primary | ICD-10-CM

## 2022-08-16 PROCEDURE — 36415 COLL VENOUS BLD VENIPUNCTURE: CPT

## 2022-08-16 PROCEDURE — 3725F SCREEN DEPRESSION PERFORMED: CPT

## 2022-08-16 PROCEDURE — 87798 DETECT AGENT NOS DNA AMP: CPT

## 2022-08-16 PROCEDURE — 99213 OFFICE O/P EST LOW 20 MIN: CPT

## 2022-08-16 PROCEDURE — 86803 HEPATITIS C AB TEST: CPT

## 2022-08-16 RX ORDER — TACROLIMUS 0.3 MG/G
OINTMENT TOPICAL 2 TIMES DAILY
Qty: 100 G | Refills: 0 | Status: CANCELLED | OUTPATIENT
Start: 2022-08-16 | End: 2022-10-03

## 2022-08-16 NOTE — PROGRESS NOTES
Family Medicine Office Visit  Sanchez Delaney 42 y o  female   MRN: [id number] : 1975  ENCOUNTER: 2022 5:28 PM    Assessment & Plan     This is a 42 y o  female who is here for Rash on Scalp      Rash  On L forehead close to midline (see photo in physical exam)  Seen in 2021 for similar rash which was suspected to be shingles at that time  Resolved after course of Valtrex  No associated visual symptoms  No other rashes  She does have PMHx psoriasis, however  This may be psoriasis as recurrent varicella zoster infections are uncommon  F/U Varicella Zoster PCR  Dermatology referral  Try using psoriasis medication, topical tacrolimus, daily for next 4 weeks  If no resolution, consider another course of Valtrex            Follow-up in 3-6 weeks for skin rash    Subjective     CC: Rash on forehead     HPI  Sanchez Delaney is a 42y o -year-old female who  has a past medical history of Breast cyst      Today she presents for a 2 week hx of a L-sided rash on her forehead  She states she had a similar rash 1 5 years ago and saw Dr Dara Coon, who suspected it may be Shingles and prescribed her a course of Valtrex  Back then, she reported shooting pain in L eye, paresthesias, and the bumps were sore to the touch  She states the rash did resolve after completing antiviral treatment  She was also counseled to follow up with her ophthalmologist however did not do this  These lesions showed up 2 weeks ago  They are progressive  They are not painful and she has no associated eye symptoms, shooting pains, or paresthesias  Tried hydrocortisone and neosporin with no improvement  Review of Systems   Constitutional: Negative  Negative for fatigue and fever  HENT: Negative  Eyes: Negative  Negative for pain, discharge and visual disturbance  Respiratory: Negative  Negative for cough, shortness of breath and wheezing  Cardiovascular: Negative    Negative for chest pain, palpitations and leg swelling  Gastrointestinal: Negative  Negative for abdominal pain, constipation, diarrhea, nausea and vomiting  Endocrine: Negative  Genitourinary: Negative  Musculoskeletal: Negative  Skin: Positive for rash  Allergic/Immunologic: Negative  Neurological: Negative  Psychiatric/Behavioral: Negative  Past Medical History, Past Surgical History, Family History, and Social History were reviewed and updated today as appropriate  Objective   /78 (BP Location: Right arm, Patient Position: Sitting, Cuff Size: Standard)   Pulse 63   Temp 97 7 °F (36 5 °C) (Temporal)   Resp 20   Ht 5' 7" (1 702 m)   Wt 70 kg (154 lb 6 oz)   SpO2 99%   BMI 24 18 kg/m²     Physical Exam  Vitals reviewed  Constitutional:       General: She is not in acute distress  Appearance: She is not ill-appearing  HENT:      Head: Normocephalic and atraumatic  Right Ear: External ear normal       Left Ear: External ear normal       Nose: Nose normal       Mouth/Throat:      Mouth: Mucous membranes are moist       Pharynx: Oropharynx is clear  No oropharyngeal exudate or posterior oropharyngeal erythema  Eyes:      Extraocular Movements: Extraocular movements intact  Conjunctiva/sclera: Conjunctivae normal       Pupils: Pupils are equal, round, and reactive to light  Cardiovascular:      Rate and Rhythm: Normal rate and regular rhythm  Pulses: Normal pulses  Heart sounds: Normal heart sounds  Pulmonary:      Effort: Pulmonary effort is normal  No respiratory distress  Breath sounds: Normal breath sounds  Abdominal:      General: Abdomen is flat  Bowel sounds are normal  There is no distension  Palpations: Abdomen is soft  Tenderness: There is no abdominal tenderness  Lymphadenopathy:      Cervical: No cervical adenopathy  Skin:     General: Skin is warm and dry  Capillary Refill: Capillary refill takes less than 2 seconds  Findings: Rash present  Comments: Rash w/ mild tenderness to palpation  No pruritus  More bothersome than painful  Neurological:      General: No focal deficit present  Mental Status: She is alert and oriented to person, place, and time  Sensory: No sensory deficit     Psychiatric:         Mood and Affect: Mood normal          Behavior: Behavior normal                   Allergies   Allergen Reactions    Penicillins     Doxycycline Hives    Penicillin G Rash       Health Maintenance     Health Maintenance   Topic Date Due    HIV Screening  Never done    DTaP,Tdap,and Td Vaccines (1 - Tdap) Never done    PT PLAN OF CARE  10/03/2020    COVID-19 Vaccine (2 - Booster for Mauro series) 05/10/2021    Influenza Vaccine (1) 09/01/2022    Breast Cancer Screening: Mammogram  06/20/2023    Annual Physical  08/10/2023    Depression Screening  08/16/2023    BMI: Adult  08/16/2023    Cervical Cancer Screening  08/10/2027    Hepatitis C Screening  Completed    Pneumococcal Vaccine: Pediatrics (0 to 5 Years) and At-Risk Patients (6 to 59 Years)  Aged Out    HIB Vaccine  Aged Out    Hepatitis B Vaccine  Aged Out    IPV Vaccine  Aged Out    Hepatitis A Vaccine  Aged Out    Meningococcal ACWY Vaccine  Aged Out    HPV Vaccine  Aged [employer] History   Administered Date(s) Administered    COVID-19 J&J (Mauro) vaccine 0 5 mL 03/15/2021    INFLUENZA 10/01/2007, 11/08/2018    Influenza Injectable, MDCK, Preservative Free, Quadrivalent, 0 5 mL 10/30/2020         Linda Parham MD   [address]  8/16/2022  5:28 PM    Parts of this note were dictated using [employer] dictation software

## 2022-08-17 LAB — HCV AB SER QL: NORMAL

## 2022-08-20 LAB — VZV DNA SPEC QL NAA+PROBE: NEGATIVE

## 2022-08-24 NOTE — ASSESSMENT & PLAN NOTE
On L forehead close to midline (see photo in physical exam)  Seen in Jan 2021 for similar rash which was suspected to be shingles at that time  Resolved after course of Valtrex  No associated visual symptoms  No other rashes  She does have PMHx psoriasis, however  This may be psoriasis as recurrent varicella zoster infections are uncommon  F/U Varicella Zoster PCR  Dermatology referral  Try using psoriasis medication, topical tacrolimus, daily for next 4 weeks    If no resolution, consider another course of Valtrex

## 2022-11-11 ENCOUNTER — OFFICE VISIT (OUTPATIENT)
Dept: FAMILY MEDICINE CLINIC | Facility: CLINIC | Age: 47
End: 2022-11-11

## 2022-11-11 VITALS
TEMPERATURE: 97.6 F | WEIGHT: 155 LBS | HEIGHT: 67 IN | BODY MASS INDEX: 24.33 KG/M2 | SYSTOLIC BLOOD PRESSURE: 118 MMHG | DIASTOLIC BLOOD PRESSURE: 78 MMHG

## 2022-11-11 DIAGNOSIS — G44.209 ACUTE NON INTRACTABLE TENSION-TYPE HEADACHE: Primary | ICD-10-CM

## 2022-11-11 NOTE — ASSESSMENT & PLAN NOTE
Patient describes a tension type headache  She also has some light intermittent non positional vertiginous symptoms which might be related to her cervical spine however as explained to the patient differential includes benign positional vertigo, labyrinthitis, cervicogenic vertigo  Patient has a completely normal neurologic examination with no concerning findings today  Recommend patient try OTC NSAIDs next 3 days  Also recommend applications of heat/ ice to the cervical spine  Demonstrated light cervical stretching maneuvers  Patient was advised to contact us in 3 days if symptoms fail to resolve or any time if her symptoms should worsen

## 2022-11-11 NOTE — PROGRESS NOTES
Family Medicine Follow-Up Office Visit  Akila Kumar 41 y o  female   MRN: [id number] : 1975  ENCOUNTER: 2022 2:22 PM    Assessment and Plan   Tension type headache   Patient describes a tension type headache  She also has some light intermittent non positional vertiginous symptoms which might be related to her cervical spine however as explained to the patient differential includes benign positional vertigo, labyrinthitis, cervicogenic vertigo  Patient has a completely normal neurologic examination with no concerning findings today  Recommend patient try OTC NSAIDs next 3 days  Also recommend applications of heat/ ice to the cervical spine  Demonstrated light cervical stretching maneuvers  Patient was advised to contact us in 3 days if symptoms fail to resolve or any time if her symptoms should worsen  Chief Complaint     Chief Complaint   Patient presents with   • Headache       History of Present Illness   Akila Kumar is a 41y o -year-old female who presents today for Evaluation of headache  with mild nonpositional vertigo  This has been present for approximately 2 days is somewhat atypical for her patient but she admits her stress level has been extremely high the last few days and wonders if this might be contributing factor  PMH is significant because she previously was diagnosed with vertigo sometime ago  Treated at home with Epley's maneuvers and symptoms resolved  The patient has no hearing loss, has no family history of Meniere's disease and previously had seen Neurology sometime ago for similar more significant vertiginous symptoms  Patient has no systemic symptoms whatsoever has no other questions or concerns at this time  Review of Systems   Review of Systems   Constitutional: Negative for chills, diaphoresis, fatigue and fever  HENT: Negative for rhinorrhea and sinus pain  Eyes: Negative for photophobia  Respiratory: Negative for shortness of breath  Cardiovascular: Negative for chest pain  Gastrointestinal: Negative for diarrhea, nausea and vomiting  Musculoskeletal: Positive for neck stiffness  Neurological: Positive for headaches  Negative for light-headedness  Active Problem List     Patient Active Problem List   Diagnosis   • Left breast lump   • Seasonal allergies   • Right elbow pain   • Acute dysfunction of right eustachian tube   • Hearing abnormally acute, right   • Rash   • Tension type headache       Past Medical History, Past Surgical History, Family History, and Social History were reviewed and updated today as appropriate  Objective   /78   Temp 97 6 °F (36 4 °C)   Ht 5' 7" (1 702 m)   Wt 70 3 kg (155 lb)   BMI 24 28 kg/m²     Physical Exam  Constitutional:       General: She is not in acute distress  HENT:      Head: Normocephalic  Right Ear: Tympanic membrane and ear canal normal  There is no impacted cerumen  Left Ear: Tympanic membrane and ear canal normal  There is no impacted cerumen  Nose: No congestion or rhinorrhea  Comments: Turbinates not injected     Mouth/Throat:      Mouth: Mucous membranes are moist       Pharynx: No oropharyngeal exudate or posterior oropharyngeal erythema  Eyes:      General: No scleral icterus  Right eye: No discharge  Left eye: No discharge  Extraocular Movements: Extraocular movements intact  Conjunctiva/sclera: Conjunctivae normal       Pupils: Pupils are equal, round, and reactive to light  Neck:      Comments: Bilateral posterior cervical spasm with tenderness  Cardiovascular:      Rate and Rhythm: Normal rate and regular rhythm  Heart sounds: Normal heart sounds  No murmur heard  Comments: Seated recheck /78 standing /88  Pulmonary:      Effort: Pulmonary effort is normal       Breath sounds: No wheezing or rales  Musculoskeletal:      Right lower leg: No edema  Left lower leg: No edema     Skin: General: Skin is warm and dry  Coloration: Skin is not jaundiced  Neurological:      General: No focal deficit present  Mental Status: She is alert  Cranial Nerves: No cranial nerve deficit  Motor: No weakness  Gait: Gait normal       Comments: Negative Romberg's, negative tandem gait         Pertinent Laboratory/Diagnostic Studies:  Lab Results   Component Value Date    BUN 17 01/05/2021    CREATININE 0 94 01/05/2021    CALCIUM 9 0 01/05/2021    K 4 1 01/05/2021    CO2 28 01/05/2021     01/05/2021     Lab Results   Component Value Date    ALT 30 01/05/2021    AST 23 01/05/2021    ALKPHOS 53 01/05/2021       Lab Results   Component Value Date    WBC 6 58 01/05/2021    HGB 13 9 01/05/2021    HCT 40 7 01/05/2021    MCV 90 01/05/2021     01/05/2021       No results found for: TSH    No results found for: CHOL  No results found for: TRIG  No results found for: HDL  No results found for: LDLCALC  No results found for: HGBA1C    Results for orders placed or performed in visit on 08/16/22   Hepatitis C Antibody ([facility])   Result Value Ref Range    Hepatitis C Ab Non-reactive Non-reactive   Varicella Zoster Virus DNA,PCR   Result Value Ref Range    VZV Real Time PCR Negative Negative       No orders of the defined types were placed in this encounter  Current Medications     Current Outpatient Medications   Medication Sig Dispense Refill   • clotrimazole-betamethasone (LOTRISONE) 1-0 05 % cream Apply topically     • mometasone (ELOCON) 0 1 % lotion APPLY TO SCALP TWO TIMES DAILY FOR 2 WEEKS THEN ONCE EVERY DAY FOR 1 WEEK THEN ON WEEKENDS AS DIRECTED     • Multiple Vitamin (MULTIVITAMIN) tablet Take 1 tablet by mouth daily     • valACYclovir (VALTREX) 1,000 mg tablet Take 1 tablet (1,000 mg total) by mouth 3 (three) times a day for 7 days 21 tablet 0     No current facility-administered medications for this visit         ALLERGIES:  Allergies   Allergen Reactions   • Penicillins    • Doxycycline Hives   • Penicillin G Rash       Health Maintenance     Health Maintenance   Topic Date Due   • HIV Screening  Never done   • DTaP,Tdap,and Td Vaccines (1 - Tdap) Never done   • PT PLAN OF CARE  10/03/2020   • COVID-19 Vaccine (2 - Booster for Mauro series) 05/10/2021   • Influenza Vaccine (1) 09/01/2022   • Colorectal Cancer Screening  10/15/2022   • Breast Cancer Screening: Mammogram  06/20/2023   • Annual Physical  08/10/2023   • Depression Screening  08/16/2023   • BMI: Adult  11/11/2023   • Cervical Cancer Screening  08/10/2027   • Hepatitis C Screening  Completed   • Pneumococcal Vaccine: Pediatrics (0 to 5 Years) and At-Risk Patients (6 to 59 Years)  Aged Out   • HIB Vaccine  Aged Out   • Hepatitis B Vaccine  Aged Out   • IPV Vaccine  Aged Out   • Hepatitis A Vaccine  Aged Out   • Meningococcal ACWY Vaccine  Aged Out   • HPV Vaccine  Aged [employer] History   Administered Date(s) Administered   • COVID-19 J&J (Mauro) vaccine 0 5 mL 03/15/2021   • INFLUENZA 10/01/2007, 11/08/2018   • Influenza Injectable, MDCK, Preservative Free, Quadrivalent, 0 5 mL 10/30/2020         Zenobia Cheng MD   [address]  11/11/2022  2:22 PM    Parts of this note were dictated using [employer] dictation software and may have sounds-like errors due to variation in pronunciation

## 2023-02-16 ENCOUNTER — OFFICE VISIT (OUTPATIENT)
Dept: FAMILY MEDICINE CLINIC | Facility: CLINIC | Age: 48
End: 2023-02-16

## 2023-02-16 VITALS
HEART RATE: 59 BPM | OXYGEN SATURATION: 99 % | HEIGHT: 67 IN | DIASTOLIC BLOOD PRESSURE: 76 MMHG | BODY MASS INDEX: 24.33 KG/M2 | WEIGHT: 155 LBS | SYSTOLIC BLOOD PRESSURE: 110 MMHG

## 2023-02-16 DIAGNOSIS — Z13.220 SCREENING FOR LIPID DISORDERS: ICD-10-CM

## 2023-02-16 DIAGNOSIS — R23.2 HOT FLASHES: Primary | ICD-10-CM

## 2023-02-16 DIAGNOSIS — Z11.4 SCREENING FOR HIV (HUMAN IMMUNODEFICIENCY VIRUS): ICD-10-CM

## 2023-02-16 DIAGNOSIS — N39.3 STRESS INCONTINENCE OF URINE: ICD-10-CM

## 2023-02-16 DIAGNOSIS — N89.8 VAGINAL DRYNESS: ICD-10-CM

## 2023-02-16 PROBLEM — R32 URINARY LEAKAGE: Status: ACTIVE | Noted: 2023-02-16

## 2023-02-16 RX ORDER — MULTIVIT WITH MIN/MFOLATE/K2 340-15/3 G
POWDER (GRAM) ORAL
COMMUNITY

## 2023-02-16 NOTE — ASSESSMENT & PLAN NOTE
Increased throughout day and night, wakes her up at night  Has some vaginal dryness and pain with intercourse    - Ordered TSH, FSH and LH  - Consider estrogen vaginal cream

## 2023-02-16 NOTE — ASSESSMENT & PLAN NOTE
Occasional stress incontinence with coughing  Feels like she has to run to the bathroom often  Had 3 vaginal deliveries  - Recommended increased Kegel exercises, f/u if not improved

## 2023-02-16 NOTE — ASSESSMENT & PLAN NOTE
New and increased, has some vaginal dryness and pain with intercourse   Experiencing diaphoresis and heat flashes    - Ordered FSH and LH  - Consider estrogen vaginal cream

## 2023-02-16 NOTE — PROGRESS NOTES
ADULT ANNUAL [address] [place],  [address] [facility] [place]    NAME: Constance Villavicencio  AGE: 41 y o  SEX: female  : 1975     DATE: 2023     Assessment and Plan:     Problem List Items Addressed This Visit        Cardiovascular and Mediastinum    Hot flashes - Primary     Increased throughout day and night, wakes her up at night  Has some vaginal dryness and pain with intercourse  - Ordered TSH, FSH and LH  - Consider estrogen vaginal cream         Relevant Orders    FSH and LH    TSH, 3rd generation with Free T4 reflex       Genitourinary    Vaginal dryness     New and increased, has some vaginal dryness and pain with intercourse  Experiencing diaphoresis and heat flashes    - Ordered FSH and LH  - Consider estrogen vaginal cream         Relevant Orders    FSH and LH       Other    Screening for HIV (human immunodeficiency virus)     Never tested before  Monogamous relationship with no Hx IV drug use  Relevant Orders    HIV-1/HIV-2 Qualitative RNA    Screening for lipid disorders    Relevant Orders    Lipid Panel with Direct LDL reflex    Urinary leakage     Occasional stress incontinence with coughing  Feels like she has to run to the bathroom often  Had 3 vaginal deliveries  - Recommended increased Kegel exercises, f/u if not improved  Immunizations and preventive care screenings were discussed with patient today  Appropriate education was printed on patient's after visit summary  Counseling:  · Hours of sleep     Patient is feeling well  Endorses occasional dizziness which has been worked up before  Of note, is only taking multivitamin, calcium, and vitamin D; not taking any Rx medications on file  Eats good diet with adequate hydration and minimal caffeine  Does not feel stressed, depressed, or anxious  Low sleep is due to packed daily schedule; does not experience insomnia   Stress: not bad at all, just a lot of running around but kids are doing great  Last pap smear in 2022 was normal      Endorses increased frequency of urination and occasional leakage with coughing, chronic psoriasis of the scalp and occasional mild rashes, and increased vaginal dryness with intercourse  Denies hair loss, visual changes, HA, SOB, chest pain, abdominal pain, n/v/d/chronic constipation, URI, numbness, tingling  No follow-ups on file  Chief Complaint:     Chief Complaint   Patient presents with   • Physical Exam      History of Present Illness:     Adult Annual Physical   Patient here for a comprehensive physical exam  The patient reports no problems  Diet and Physical Activity  · Diet/Nutrition: well balanced diet  · Exercise: 30-60 minutes on average  Depression Screening  PHQ-2/9 Depression Screening    Little interest or pleasure in doing things: 0 - not at all  Feeling down, depressed, or hopeless: 0 - not at all  PHQ-2 Score: 0  PHQ-2 Interpretation: Negative depression screen       General Health  · Sleep: gets 4-6 hours of sleep on average  She tends to go to bed at 9-11 pm and wakes up at 3:30 AM to exercise  · Hearing: normal - bilateral  Humming in the ears has resolved  · Vision: wears contacts  Has been experiencing some on and off irritation with her contacts lately  · Dental: regular dental visits  But has been diagnosed with multiple cavities lately despite regular brushing and flossing and low sugar diet  /GYN Health  · Patient is: perimenopausal, has hot flashes  · Last menstrual period: Unknown; had ablation in 2015 and no menstruation since then  · Contraceptive method: sterility  Review of Systems:     Review of Systems   Constitutional: Positive for diaphoresis  Negative for activity change, appetite change, chills, fever and unexpected weight change  HENT: Positive for dental problem  Negative for congestion, ear discharge, ear pain, hearing loss, rhinorrhea, sneezing and sore throat      Eyes: Negative for pain, redness and itching  Respiratory: Negative for apnea, cough and shortness of breath  Cardiovascular: Negative for chest pain and leg swelling  Gastrointestinal: Negative for abdominal distention, abdominal pain, blood in stool, diarrhea, nausea and vomiting  Occasional constipation   Genitourinary: Positive for frequency  Negative for difficulty urinating, dysuria and urgency  Musculoskeletal: Negative for arthralgias, back pain, gait problem and myalgias  Skin: Positive for rash  Negative for color change and wound  Neurological: Positive for dizziness  Negative for weakness, numbness and headaches  Occasional dizziness   Psychiatric/Behavioral: Negative for agitation, confusion and sleep disturbance  The patient is not nervous/anxious         Past Medical History:     Past Medical History:   Diagnosis Date   • Breast cyst       Past Surgical History:     Past Surgical History:   Procedure Laterality Date   • APPENDECTOMY     • BREAST BIOPSY Left 2018   • HERNIA REPAIR     • HYSTEROSCOPY W/ ENDOMETRIAL ABLATION  12/08/2015   • US GUIDED BREAST BIOPSY LEFT COMPLETE Left 5/25/2018      Social History:     Social History     Socioeconomic History   • Marital status: /Civil Union     Spouse name: Not on file   • Number of children: Not on file   • Years of education: Not on file   • Highest education level: Not on file   Occupational History   • Not on file   Tobacco Use   • Smoking status: Never   • Smokeless tobacco: Never   Substance and Sexual Activity   • Alcohol use: Yes     Comment: 1x per week   • Drug use: No   • Sexual activity: Yes     Partners: Male     Birth control/protection: Male Sterilization     Comment: Massiel Baird   Other Topics Concern   • Not on file   Social History Narrative   • Not on file     Social Determinants of Health     Financial Resource Strain: Not on file   Food Insecurity: Not on file   Transportation Needs: Not on file   Physical Activity: Not on file   Stress: Not on file   Social Connections: Not on file   Intimate Partner Violence: Not on file   Housing Stability: Not on file      Family History:     Family History   Problem Relation Age of Onset   • Hypertension Mother    • No Known Problems Father    • No Known Problems Brother    • Cancer Maternal Aunt         vulva cancer   • No Known Problems Daughter    • Kidney disease Maternal Grandmother    • Heart attack Maternal Grandfather    • Cancer Paternal Grandmother    • No Known Problems Paternal Grandfather    • No Known Problems Maternal Aunt    • Cancer Paternal Aunt         non-hodgkins lymphoma   • No Known Problems Maternal Aunt       Current Medications:     Current Outpatient Medications   Medication Sig Dispense Refill   • mometasone (ELOCON) 0 1 % lotion APPLY TO SCALP TWO TIMES DAILY FOR 2 WEEKS THEN ONCE EVERY DAY FOR 1 WEEK THEN ON WEEKENDS AS DIRECTED     • Multiple Vitamin (MULTIVITAMIN) tablet Take 1 tablet by mouth daily     • Calcium Citrate-Vitamin D 1000-0 01 MG/30ML LIQD      • Cholecalciferol (D3 Liquid) 25 MCG/0 04ML LIQD      • clotrimazole-betamethasone (LOTRISONE) 1-0 05 % cream Apply topically     • valACYclovir (VALTREX) 1,000 mg tablet Take 1 tablet (1,000 mg total) by mouth 3 (three) times a day for 7 days 21 tablet 0     No current facility-administered medications for this visit  Allergies: Allergies   Allergen Reactions   • Penicillins    • Doxycycline Hives   • Penicillin G Rash      Physical Exam:     /76   Pulse 59   Ht 5' 7" (1 702 m)   Wt 70 3 kg (155 lb)   SpO2 99%   BMI 24 28 kg/m²     Physical Exam  Constitutional:       General: She is not in acute distress  Appearance: Normal appearance  She is normal weight  She is not diaphoretic  HENT:      Head: Normocephalic and atraumatic  Right Ear: Tympanic membrane normal       Left Ear: Tympanic membrane normal       Nose: Nose normal  No congestion        Mouth/Throat:      Mouth: Mucous membranes are moist    Eyes:      General: No scleral icterus  Conjunctiva/sclera: Conjunctivae normal    Cardiovascular:      Rate and Rhythm: Regular rhythm  Bradycardia present  Pulses: Normal pulses  Heart sounds: Normal heart sounds  No murmur heard  No friction rub  No gallop  Pulmonary:      Effort: Pulmonary effort is normal  No respiratory distress  Breath sounds: Normal breath sounds  No stridor  No wheezing, rhonchi or rales  Abdominal:      General: Abdomen is flat  Bowel sounds are normal  There is no distension  Palpations: Abdomen is soft  There is no mass  Tenderness: There is no abdominal tenderness  There is no guarding or rebound  Hernia: No hernia is present  Musculoskeletal:         General: No swelling, tenderness or signs of injury  Normal range of motion  Cervical back: Normal range of motion and neck supple  Right lower leg: No edema  Left lower leg: No edema  Skin:     General: Skin is warm and dry  Capillary Refill: Capillary refill takes less than 2 seconds  Coloration: Skin is not jaundiced  Findings: No bruising, erythema or lesion  Neurological:      General: No focal deficit present  Mental Status: She is alert and oriented to person, place, and time  Psychiatric:         Mood and Affect: Mood normal          Behavior: Behavior normal          Thought Content:  Thought content normal          Judgment: Judgment normal             [facility]

## 2023-02-21 NOTE — PROGRESS NOTES
ADULT ANNUAL [address] [place],  [address] [facility] MEDICINE [place]    NAME: Chelsy Romero  AGE: 41 y o  SEX: female  : 1975     DATE: 2023     Assessment and Plan:     Problem List Items Addressed This Visit        Cardiovascular and Mediastinum    Hot flashes - Primary     Increased throughout day and night, wakes her up at night  Has some vaginal dryness and pain with intercourse  - Ordered TSH, FSH and LH  - Consider estrogen vaginal cream         Relevant Orders    FSH and LH    TSH, 3rd generation with Free T4 reflex       Genitourinary    Vaginal dryness     New and increased, has some vaginal dryness and pain with intercourse  Experiencing diaphoresis and heat flashes    - Ordered FSH and LH  - Consider estrogen vaginal cream         Relevant Orders    FSH and LH       Other    Screening for HIV (human immunodeficiency virus)     Never tested before  Monogamous relationship with no Hx IV drug use  Relevant Orders    HIV-1/HIV-2 Qualitative RNA    Screening for lipid disorders    Relevant Orders    Lipid Panel with Direct LDL reflex    Urinary leakage     Occasional stress incontinence with coughing  Feels like she has to run to the bathroom often  Had 3 vaginal deliveries  - Recommended increased Kegel exercises, f/u if not improved  Immunizations and preventive care screenings were discussed with patient today  Appropriate education was printed on patient's after visit summary  Counseling:  · Hours of sleep      Depression Screening and Follow-up Plan: Patient was screened for depression during today's encounter  They screened negative with a PHQ-2 score of 0  Patient is feeling well  Endorses occasional dizziness which has been worked up before  Of note, is only taking multivitamin, calcium, and vitamin D; not taking any Rx medications on file  Eats good diet with adequate hydration and minimal caffeine   Does not feel stressed, depressed, or anxious  Low sleep is due to packed daily schedule; does not experience insomnia  Stress: not bad at all, just a lot of running around but kids are doing great  Last pap smear in 2022 was normal      Endorses increased frequency of urination and occasional leakage with coughing, chronic psoriasis of the scalp and occasional mild rashes, and increased vaginal dryness with intercourse  Denies hair loss, visual changes, HA, SOB, chest pain, abdominal pain, n/v/d/chronic constipation, URI, numbness, tingling  No follow-ups on file  Chief Complaint:     Chief Complaint   Patient presents with   • Physical Exam      History of Present Illness:     Adult Annual Physical   Patient here for a comprehensive physical exam  The patient reports no problems  Diet and Physical Activity  · Diet/Nutrition: well balanced diet  · Exercise: 30-60 minutes on average  Depression Screening  PHQ-2/9 Depression Screening    Little interest or pleasure in doing things: 0 - not at all  Feeling down, depressed, or hopeless: 0 - not at all  PHQ-2 Score: 0  PHQ-2 Interpretation: Negative depression screen       General Health  · Sleep: gets 4-6 hours of sleep on average  She tends to go to bed at 9-11 pm and wakes up at 3:30 AM to exercise  · Hearing: normal - bilateral  Humming in the ears has resolved  · Vision: wears contacts  Has been experiencing some on and off irritation with her contacts lately  · Dental: regular dental visits  But has been diagnosed with multiple cavities lately despite regular brushing and flossing and low sugar diet  /GYN Health  · Patient is: perimenopausal, has hot flashes  · Last menstrual period: Unknown; had ablation in 2015 and no menstruation since then  · Contraceptive method: sterility  Review of Systems:     Review of Systems   Constitutional: Positive for diaphoresis  Negative for activity change, appetite change, chills, fever and unexpected weight change  HENT: Positive for dental problem  Negative for congestion, ear discharge, ear pain, hearing loss, rhinorrhea, sneezing and sore throat  Eyes: Negative for pain, redness and itching  Respiratory: Negative for apnea, cough and shortness of breath  Cardiovascular: Negative for chest pain and leg swelling  Gastrointestinal: Negative for abdominal distention, abdominal pain, blood in stool, diarrhea, nausea and vomiting  Occasional constipation   Genitourinary: Positive for frequency  Negative for difficulty urinating, dysuria and urgency  Musculoskeletal: Negative for arthralgias, back pain, gait problem and myalgias  Skin: Positive for rash  Negative for color change and wound  Neurological: Positive for dizziness  Negative for weakness, numbness and headaches  Occasional dizziness   Psychiatric/Behavioral: Negative for agitation, confusion and sleep disturbance  The patient is not nervous/anxious         Past Medical History:     Past Medical History:   Diagnosis Date   • Breast cyst       Past Surgical History:     Past Surgical History:   Procedure Laterality Date   • APPENDECTOMY     • BREAST BIOPSY Left 2018   • HERNIA REPAIR     • HYSTEROSCOPY W/ ENDOMETRIAL ABLATION  12/08/2015   • US GUIDED BREAST BIOPSY LEFT COMPLETE Left 5/25/2018      Social History:     Social History     Socioeconomic History   • Marital status: /Civil Union     Spouse name: Not on file   • Number of children: Not on file   • Years of education: Not on file   • Highest education level: Not on file   Occupational History   • Not on file   Tobacco Use   • Smoking status: Never   • Smokeless tobacco: Never   Substance and Sexual Activity   • Alcohol use: Yes     Comment: 1x per week   • Drug use: No   • Sexual activity: Yes     Partners: Male     Birth control/protection: Male Sterilization     Comment: Deanna Fountain   Other Topics Concern   • Not on file   Social History Narrative   • Not on file     Social Determinants of Health     Financial Resource Strain: Not on file   Food Insecurity: Not on file   Transportation Needs: Not on file   Physical Activity: Not on file   Stress: Not on file   Social Connections: Not on file   Intimate Partner Violence: Not on file   Housing Stability: Not on file      Family History:     Family History   Problem Relation Age of Onset   • Hypertension Mother    • No Known Problems Father    • No Known Problems Brother    • Cancer Maternal Aunt         vulva cancer   • No Known Problems Daughter    • Kidney disease Maternal Grandmother    • Heart attack Maternal Grandfather    • Cancer Paternal Grandmother    • No Known Problems Paternal Grandfather    • No Known Problems Maternal Aunt    • Cancer Paternal Aunt         non-hodgkins lymphoma   • No Known Problems Maternal Aunt       Current Medications:     Current Outpatient Medications   Medication Sig Dispense Refill   • mometasone (ELOCON) 0 1 % lotion APPLY TO SCALP TWO TIMES DAILY FOR 2 WEEKS THEN ONCE EVERY DAY FOR 1 WEEK THEN ON WEEKENDS AS DIRECTED     • Multiple Vitamin (MULTIVITAMIN) tablet Take 1 tablet by mouth daily     • Calcium Citrate-Vitamin D 1000-0 01 MG/30ML LIQD      • Cholecalciferol (D3 Liquid) 25 MCG/0 04ML LIQD      • clotrimazole-betamethasone (LOTRISONE) 1-0 05 % cream Apply topically     • valACYclovir (VALTREX) 1,000 mg tablet Take 1 tablet (1,000 mg total) by mouth 3 (three) times a day for 7 days 21 tablet 0     No current facility-administered medications for this visit  Allergies: Allergies   Allergen Reactions   • Penicillins    • Doxycycline Hives   • Penicillin G Rash      Physical Exam:     /76   Pulse 59   Ht 5' 7" (1 702 m)   Wt 70 3 kg (155 lb)   SpO2 99%   BMI 24 28 kg/m²     Physical Exam  Constitutional:       General: She is not in acute distress  Appearance: Normal appearance  She is normal weight  She is not diaphoretic     HENT:      Head: Normocephalic and atraumatic  Right Ear: Tympanic membrane normal       Left Ear: Tympanic membrane normal       Nose: Nose normal  No congestion  Mouth/Throat:      Mouth: Mucous membranes are moist    Eyes:      General: No scleral icterus  Conjunctiva/sclera: Conjunctivae normal    Cardiovascular:      Rate and Rhythm: Regular rhythm  Bradycardia present  Pulses: Normal pulses  Heart sounds: Normal heart sounds  No murmur heard  No friction rub  No gallop  Pulmonary:      Effort: Pulmonary effort is normal  No respiratory distress  Breath sounds: Normal breath sounds  No stridor  No wheezing, rhonchi or rales  Abdominal:      General: Abdomen is flat  Bowel sounds are normal  There is no distension  Palpations: Abdomen is soft  There is no mass  Tenderness: There is no abdominal tenderness  There is no guarding or rebound  Hernia: No hernia is present  Musculoskeletal:         General: No swelling, tenderness or signs of injury  Normal range of motion  Cervical back: Normal range of motion and neck supple  Right lower leg: No edema  Left lower leg: No edema  Skin:     General: Skin is warm and dry  Capillary Refill: Capillary refill takes less than 2 seconds  Coloration: Skin is not jaundiced  Findings: No bruising, erythema or lesion  Neurological:      General: No focal deficit present  Mental Status: She is alert and oriented to person, place, and time  Psychiatric:         Mood and Affect: Mood normal          Behavior: Behavior normal          Thought Content:  Thought content normal          Judgment: Judgment normal             [facility]

## 2023-03-04 ENCOUNTER — APPOINTMENT (OUTPATIENT)
Dept: LAB | Age: 48
End: 2023-03-04

## 2023-03-04 DIAGNOSIS — R23.2 HOT FLASHES: ICD-10-CM

## 2023-03-04 DIAGNOSIS — Z11.4 SCREENING FOR HIV (HUMAN IMMUNODEFICIENCY VIRUS): ICD-10-CM

## 2023-03-04 DIAGNOSIS — N89.8 VAGINAL DRYNESS: ICD-10-CM

## 2023-03-04 DIAGNOSIS — Z13.220 SCREENING FOR LIPID DISORDERS: ICD-10-CM

## 2023-03-04 LAB
CHOLEST SERPL-MCNC: 201 MG/DL
FSH SERPL-ACNC: 72.5 MIU/ML
HDLC SERPL-MCNC: 73 MG/DL
HIV 1+2 AB+HIV1 P24 AG SERPL QL IA: NORMAL
HIV 2 AB SERPL QL IA: NORMAL
HIV1 AB SERPL QL IA: NORMAL
HIV1 P24 AG SERPL QL IA: NORMAL
LDLC SERPL CALC-MCNC: 113 MG/DL (ref 0–100)
LH SERPL-ACNC: 37.6 MIU/ML
TRIGL SERPL-MCNC: 75 MG/DL
TSH SERPL DL<=0.05 MIU/L-ACNC: 1.76 UIU/ML (ref 0.45–4.5)

## 2023-03-07 ENCOUNTER — TELEPHONE (OUTPATIENT)
Dept: FAMILY MEDICINE CLINIC | Facility: CLINIC | Age: 48
End: 2023-03-07

## 2023-03-31 NOTE — ASSESSMENT & PLAN NOTE
Due to persistent humming sound in R ear despite normal imaging and hearing tests, will check labs (CBC, CMP, TSH, B12) and refer to neurology  May need EEG(?) vs consideration of other evaluation  HPI:  81 yo M with history of HTN, DM, DL, Afib ( on eliquis), bladder CA s/p cystectomy and urostomy. Patient presenting with right sided ureteral stone with evidence of obstruction. Patient started having back pain yesterday right sided. urine in urostomy bag was cloudy and dark in color. He was going to his PCP but developed high fever and chills so he came to ED [place]. in [facility] site patient was hypotensive and tachycardic requiring 5 liters fluids and was started on pressors. cultures were taken and he was given broad spectrum antibiotics. His HD status improved. CT abdomen showed Right-sided hydronephrosis and right-sided hydroureter. Findings secondary to a stone within the mid aspect of the right ureter. Extensive inflammatory changes are seen in the region of the stone,   extending superiorly surrounding a calcification, which may reflect a previously ruptured stone. Bilateral intrarenal calculi as described. Lactate 8 on [facility] went down to 6. He was transferred to north site for eliquis reversal before intervention by IR for PCN (28 Mar 2023 22:54)    Hospital course:   pt was admitted in MICU for septic shock 2/2 to acute pyelonephritis caused was stone obs. Pt was weaned off levo. left PCN was done was IR, and pt was started on meropenem which was subsequently moved to rocephin and then Levoflox PO upon dc. Pt was seen by PT, and is stable for dc today.    Impression :  Septic shock  pyelonephritis  HTN  DM  DL  Afib  Bladder ca s/p Cystectomy/penectomy and ileal conduit      #Urosepsis 2/2 to obstructive stone, with hydronephrosis   #Hx Bladder Ca s/p cystectomy and urostomy  - s/p 5 L in Ed  - On admission: Lactate 8.3 -> improving 2.6   - Off levo now   - on 2 L NC  - CT abdomen and pelvis with IV con  Right-sided hydronephrosis and right-sided hydroureter.   Findings secondary to a stone within the mid aspect of the right ureter.   Extensive inflammatory changes are seen in the region of the stone, extending superiorly surrounding a calcification, which may reflect a previously ruptured stone. Bilateral intrarenal calculi as described.   Bilateral renal cysts.  - As per Urology: cystoscopy/stent will not be successful due to pts prior surgical history. cs IR for PCN  - As per IR: Reverse Eliquis as patient has poor renal function -> Kcentra given. Last dose was 3/27 at 9pm Renal interventions such as a nephrostomy tube placement is a high risk  bleeding procedure. -> PCN done on 3/29  - Anesthesia consult placed   - As per IR: Restart Eliquis 48 hr after procedure (it will be 3/31 afternoon), Make sure clear urine draining   - BCx 3/28: E coli   - UA positive   - c/w roephin (levo upon dc)     #LORRAINE post renal  - Baseline crea: 1.2  - Home Lasix 20 mg QD on Hold   - Home meds Sodium bicarb 650 mg BID and Potassium Citrate 10 meq QD     #Chronic A fib  #HTN  - on Eliquis -> Holding for procedure (restart 48 hr after procedure)  - Home Metoprolol succ 50 QD on hold for low BP  - On admission: Trop: 0.1  - Trend trops, f/u repeat trop  - Echo 3/29:  EF of 60 %, pulmonary artery systolic pressure is 35.5 mmHg    #DM:  - Home glimepiride 2 mg 2.5 tb QD  - Inpt: Sliding Scale    stable for dc today HPI:  83 yo M with history of HTN, DM, DL, Afib ( on eliquis), bladder CA s/p cystectomy and urostomy. Patient presenting with right sided ureteral stone with evidence of obstruction. Patient started having back pain yesterday right sided. urine in urostomy bag was cloudy and dark in color. He was going to his PCP but developed high fever and chills so he came to ED [facility]. in [facility] site patient was hypotensive and tachycardic requiring 5 liters fluids and was started on pressors. cultures were taken and he was given broad spectrum antibiotics. His HD status improved. CT abdomen showed Right-sided hydronephrosis and right-sided hydroureter. Findings secondary to a stone within the mid aspect of the right ureter. Extensive inflammatory changes are seen in the region of the stone,   extending superiorly surrounding a calcification, which may reflect a previously ruptured stone. Bilateral intrarenal calculi as described. Lactate 8 on [facility] went down to 6. He was transferred to north site for eliquis reversal before intervention by IR for PCN (28 Mar 2023 22:54)    Hospital course:   pt was admitted in MICU for septic shock 2/2 to acute complicated pyelonephritis 2/2 was stone obs. Pt was weaned off levo. left PCN was done was IR, and pt was started on meropenem which was subsequently moved to rocephin and then Levoflox PO upon dc until 4/14 . Pt was seen by PT, and is stable for dc today. Plan to follow up with IR and Urology outpatient.     Impression :  Septic shock  pyelonephritis  HTN  DM  DL  Afib  Bladder ca s/p Cystectomy/penectomy and ileal conduit    #Urosepsis 2/2 to obstructive stone, with hydronephrosis   #Hx Bladder Ca s/p cystectomy and urostomy  - s/p 5 L in Ed  - On admission: Lactate 8.3 -> improving 2.6   - Off levo now   - on 2 L NC  - CT abdomen and pelvis with IV con  Right-sided hydronephrosis and right-sided hydroureter.   Findings secondary to a stone within the mid aspect of the right ureter.   Extensive inflammatory changes are seen in the region of the stone, extending superiorly surrounding a calcification, which may reflect a previously ruptured stone. Bilateral intrarenal calculi as described.   Bilateral renal cysts.  - As per Urology: cystoscopy/stent will not be successful due to pts prior surgical history. cs IR for PCN  - As per IR: Reverse Eliquis as patient has poor renal function -> Kcentra given. Last dose was 3/27 at 9pm Renal interventions such as a nephrostomy tube placement is a high risk  bleeding procedure. -> PCN done on 3/29  - As per IR: Restart Eliquis 48 hr after procedure (it will be 3/31 afternoon), Make sure clear urine draining   - BCx 3/28: E coli   - UA positive   - c/w roephin and discharge on ciprofloxacin     #LORRAINE post renal  - Baseline crea: 1.2  - Home Lasix 20 mg QD on Hold   - Home meds Sodium bicarb 650 mg BID and Potassium Citrate 10 meq QD     #Chronic A fib  #HTN  - on Eliquis -> Holding for procedure (restart 48 hr after procedure)  - Home Metoprolol succ 50 QD on hold for low BP  - On admission: Trop: 0.1  - Trend trops, f/u repeat trop  - Echo 3/29:  EF of 60 %, pulmonary artery systolic pressure is 35.5 mmHg    #DM:  - Home glimepiride 2 mg 2.5 tb QD  - Inpt: Sliding Scale    stable for dc today HPI:  81 yo M with history of HTN, DM, DL, Afib ( on eliquis), bladder CA s/p cystectomy and urostomy. Patient presenting with right sided ureteral stone with evidence of obstruction. Patient started having back pain yesterday right sided. urine in urostomy bag was cloudy and dark in color. He was going to his PCP but developed high fever and chills so he came to ED [facility]. in [facility] site patient was hypotensive and tachycardic requiring 5 liters fluids and was started on pressors. cultures were taken and he was given broad spectrum antibiotics. His HD status improved. CT abdomen showed Right-sided hydronephrosis and right-sided hydroureter. Findings secondary to a stone within the mid aspect of the right ureter. Extensive inflammatory changes are seen in the region of the stone,   extending superiorly surrounding a calcification, which may reflect a previously ruptured stone. Bilateral intrarenal calculi as described. Lactate 8 on [facility] went down to 6. He was transferred to north site for eliquis reversal before intervention by IR for PCN (28 Mar 2023 22:54)    Hospital course:   pt was admitted in MICU for septic shock 2/2 to acute complicated pyelonephritis 2/2 was stone obs. Pt was weaned off levo. left PCN was done was IR, and pt was started on meropenem which was subsequently moved to rocephin and then ciprofloxacin PO BID upon dc until 4/14 . Pt was seen by PT, and is stable for dc today. Plan to follow up with IR and Urology outpatient.     Impression :  Septic shock  pyelonephritis  HTN  DM  DL  Afib  Bladder ca s/p Cystectomy/penectomy and ileal conduit    #Urosepsis 2/2 to obstructive stone, with hydronephrosis   #Hx Bladder Ca s/p cystectomy and urostomy  - s/p 5 L in Ed  - On admission: Lactate 8.3 -> improving 2.6   - Off levo now   - on 2 L NC  - CT abdomen and pelvis with IV con  Right-sided hydronephrosis and right-sided hydroureter.   Findings secondary to a stone within the mid aspect of the right ureter.   Extensive inflammatory changes are seen in the region of the stone, extending superiorly surrounding a calcification, which may reflect a previously ruptured stone. Bilateral intrarenal calculi as described.   Bilateral renal cysts.  - As per Urology: cystoscopy/stent will not be successful due to pts prior surgical history. cs IR for PCN  - As per IR: Reverse Eliquis as patient has poor renal function -> Kcentra given. Last dose was 3/27 at 9pm Renal interventions such as a nephrostomy tube placement is a high risk  bleeding procedure. -> PCN done on 3/29  - As per IR: Restart Eliquis 48 hr after procedure (it will be 3/31 afternoon), Make sure clear urine draining   - BCx 3/28: E coli   - UA positive   - c/w roephin and discharge on ciprofloxacin     #LORRAINE post renal  - Baseline crea: 1.2  - Home Lasix 20 mg QD on Hold   - Home meds Sodium bicarb 650 mg BID and Potassium Citrate 10 meq QD     #Chronic A fib  #HTN  - on Eliquis -> Holding for procedure (restart 48 hr after procedure)  - Home Metoprolol succ 50 QD on hold for low BP  - On admission: Trop: 0.1  - Trend trops, f/u repeat trop  - Echo 3/29:  EF of 60 %, pulmonary artery systolic pressure is 35.5 mmHg    #DM:  - Home glimepiride 2 mg 2.5 tb QD  - Inpt: Sliding Scale    stable for dc today 83 yo M with history of HTN, DM, DL, Afib ( on eliquis), bladder CA s/p cystectomy and urostomy. Patient presenting with right sided ureteral stone with evidence of obstruction. Patient started having back pain yesterday right sided. urine in urostomy bag was cloudy and dark in color. He was going to his PCP but developed high fever and chills so he came to ED [facility]. in [facility] site patient was hypotensive and tachycardic requiring 5 liters fluids and was started on pressors. cultures were taken and he was given broad spectrum antibiotics. His HD status improved. CT abdomen showed Right-sided hydronephrosis and right-sided hydroureter. Findings secondary to a stone within the mid aspect of the right ureter. Extensive inflammatory changes are seen in the region of the stone, extending superiorly surrounding a calcification, which may reflect a previously ruptured stone. Bilateral intrarenal calculi as described. Lactate 8 on [facility] went down to 6. He was transferred to north site for eliquis reversal before intervention by IR for PCN (28 Mar 2023 22:54)    # Sepsis POA, septic shock- sec to pyelonephritis- resolved  # Lactic acidosis resolved  # Bacteremia with E.coli  # Right-sided hydronephrosis and right-sided hydroureter sec to right ureter stone  # H/o Bladder Ca s/p cystectomy and urostomy  -  CT Abdomen and Pelvis w/ IV Cont (03.28.23 @ 16:24) >Right-sided hydronephrosis and right-sidedhydroureter.  Findings secondary to a stone within the mid aspect of the right ureter. Extensive inflammatory changes are seen in the region of the stone, extending superiorly surrounding a calcification, which may reflect a previously ruptured stone. Bilateral intrarenal calculi as described. Bilateral renal cysts.  - blood Culture Results: Growth in aerobic and anaerobic bottles: Escherichia coli (03.28.23 @ 15:35)  Culture Results:   - s/p  IV fluids, pressors  - s/p right PCN by IR on 3/29.  Follow-up outpatient with  for definitive stone management  - ID eval: Rocephin 2 gm iv q24h> could change to po Ciprofloxacin 750 mg q12h starting 4/1 till 4/14  - Follow-up outpatient with  (Dr. Banks) for definitive stone management.     # Acute kidney injury , post renal-resolved    # Chronic afib  # Hypertension  - c/w  metoprolol, eliquis    # DM type 2  - A1C with Estimated Average Glucose Result: 9.0  (03.29.23 @ 05:10)  - c/w home meds      stable for dc today

## 2023-04-17 PROBLEM — Z13.220 SCREENING FOR LIPID DISORDERS: Status: RESOLVED | Noted: 2023-02-16 | Resolved: 2023-04-17

## 2023-07-17 ENCOUNTER — HOSPITAL ENCOUNTER (OUTPATIENT)
Dept: RADIOLOGY | Age: 48
Discharge: HOME | End: 2023-07-17
Payer: COMMERCIAL

## 2023-07-17 VITALS — HEIGHT: 67 IN | WEIGHT: 155 LBS | BODY MASS INDEX: 24.33 KG/M2

## 2023-07-17 DIAGNOSIS — Z12.31 ENCOUNTER FOR SCREENING MAMMOGRAM FOR MALIGNANT NEOPLASM OF BREAST: ICD-10-CM

## 2023-07-17 PROCEDURE — 77063 BREAST TOMOSYNTHESIS BI: CPT

## 2023-07-17 PROCEDURE — 77067 SCR MAMMO BI INCL CAD: CPT

## 2023-08-02 ENCOUNTER — ANNUAL EXAM (OUTPATIENT)
Dept: OBGYN CLINIC | Facility: CLINIC | Age: 48
End: 2023-08-02
Payer: COMMERCIAL

## 2023-08-02 VITALS
DIASTOLIC BLOOD PRESSURE: 68 MMHG | SYSTOLIC BLOOD PRESSURE: 126 MMHG | HEIGHT: 67 IN | WEIGHT: 158 LBS | BODY MASS INDEX: 24.8 KG/M2

## 2023-08-02 DIAGNOSIS — Z01.419 WOMEN'S ANNUAL ROUTINE GYNECOLOGICAL EXAMINATION: Primary | ICD-10-CM

## 2023-08-02 PROCEDURE — S0612 ANNUAL GYNECOLOGICAL EXAMINA: HCPCS | Performed by: OBSTETRICS & GYNECOLOGY

## 2023-08-02 NOTE — PROGRESS NOTES
Assessment/Plan:    The patient was informed of a stable GYN examination. Pap smear was not performed. She is happy with her history of endometrial ablation she is not getting any menstrual cycles. She did get checked for a serum FSH and LH they were borderline. We may consider repeating this next year. She does require lubricant with intimacy. She is content with her weight. She is a dentist on a regular basis. She feels safe at home. There are no new major family illnesses to report at this time. Subjective:      Patient ID: Josefina Slater is a 41 y.o. female. HPI    This is a 47-year-old white female, she is a  6 para 3 with 3 prior vaginal deliveries. She does not get regular menstrual cycles because of a history of endometrial ablation. She had an [facility] and LH drawn that she had there which were borderline. Still sexually active requiring lubrication. No major  or GI complaint. Occasional stress urine incontinence but under control. No problem with intimacy. She is content with her weight. She feels safe at home. She sees a dentist on a regular basis. Denies any prior depression or anxiety. There are no new major family illnesses to report. She does not need a Pap smear today. The following portions of the patient's history were reviewed and updated as appropriate: current medications, past family history, past medical history, past social history, past surgical history and problem list.    Review of Systems   All other systems reviewed and are negative. Objective:      /68   Ht 5' 7" (1.702 m)   Wt 71.7 kg (158 lb)   BMI 24.75 kg/m²          Physical Exam  Vitals reviewed. Exam conducted with a chaperone present. Constitutional:       Appearance: Normal appearance. She is normal weight. HENT:      Head: Normocephalic and atraumatic.       Nose: Nose normal.      Mouth/Throat:      Mouth: Mucous membranes are moist.   Eyes:      Extraocular Movements: Extraocular movements intact. Pupils: Pupils are equal, round, and reactive to light. Cardiovascular:      Rate and Rhythm: Normal rate and regular rhythm. Pulses: Normal pulses. Heart sounds: Normal heart sounds. Pulmonary:      Effort: Pulmonary effort is normal.      Breath sounds: Normal breath sounds. Chest:   Breasts:     Breasts are symmetrical.      Right: Normal.      Left: Normal.   Abdominal:      General: Abdomen is flat. Bowel sounds are normal.      Palpations: Abdomen is soft. There is no hepatomegaly or splenomegaly. Hernia: No hernia is present. There is no hernia in the left inguinal area or right inguinal area. Genitourinary:     General: Normal vulva. Pubic Area: No rash or pubic lice. Labia:         Right: No rash, tenderness, lesion or injury. Left: No rash, tenderness, lesion or injury. Urethra: No prolapse, urethral pain, urethral swelling or urethral lesion. Rectum: Normal.      Comments: The external genitalia normal limits the vagina is clean the uterus is clean cervix parous but closed uterus is midposition normal size. There is no cervical motion tenderness. There is no evidence of prolapse. Urethra is normal appearance. A Pap smear was not performed. There is no cervical motion tenderness. Musculoskeletal:         General: Normal range of motion. Cervical back: Normal range of motion and neck supple. Lymphadenopathy:      Upper Body:      Right upper body: No supraclavicular adenopathy. Left upper body: No supraclavicular adenopathy. Skin:     General: Skin is warm and dry. Neurological:      General: No focal deficit present. Mental Status: She is alert and oriented to person, place, and time. Psychiatric:         Mood and Affect: Mood normal.         Behavior: Behavior normal.         Thought Content:  Thought content normal.

## 2023-08-02 NOTE — PATIENT INSTRUCTIONS
The patient was informed of a stable GYN examination. We will continue to look for signs symptoms of menopause. She will continue using lubricant. We had a discussion about making arrangements for colorectal screening. She may consider Cologuard and will discuss this with her primary care physician. She should return to my office in 1 year. If perimenopausal symptoms worsen she will contact me.

## 2023-12-20 ENCOUNTER — TELEPHONE (OUTPATIENT)
Dept: FAMILY MEDICINE CLINIC | Facility: CLINIC | Age: 48
End: 2023-12-20

## 2023-12-21 ENCOUNTER — APPOINTMENT (OUTPATIENT)
Dept: RADIOLOGY | Age: 48
End: 2023-12-21
Payer: COMMERCIAL

## 2023-12-21 ENCOUNTER — OFFICE VISIT (OUTPATIENT)
Dept: URGENT CARE | Age: 48
End: 2023-12-21
Payer: COMMERCIAL

## 2023-12-21 VITALS
TEMPERATURE: 98.6 F | DIASTOLIC BLOOD PRESSURE: 64 MMHG | SYSTOLIC BLOOD PRESSURE: 138 MMHG | HEART RATE: 88 BPM | OXYGEN SATURATION: 100 % | RESPIRATION RATE: 16 BRPM

## 2023-12-21 DIAGNOSIS — M79.641 PAIN OF RIGHT HAND: ICD-10-CM

## 2023-12-21 DIAGNOSIS — K64.9 HEMORRHOIDS, UNSPECIFIED HEMORRHOID TYPE: Primary | ICD-10-CM

## 2023-12-21 PROCEDURE — G0382 LEV 3 HOSP TYPE B ED VISIT: HCPCS | Performed by: NURSE PRACTITIONER

## 2023-12-21 PROCEDURE — S9083 URGENT CARE CENTER GLOBAL: HCPCS | Performed by: NURSE PRACTITIONER

## 2023-12-21 PROCEDURE — 73130 X-RAY EXAM OF HAND: CPT

## 2023-12-21 RX ORDER — HYDROCORTISONE ACETATE 25 MG/1
25 SUPPOSITORY RECTAL 2 TIMES DAILY
Qty: 12 SUPPOSITORY | Refills: 0 | Status: SHIPPED | OUTPATIENT
Start: 2023-12-21

## 2023-12-21 NOTE — PROGRESS NOTES
[facility]        NAME: Linda Pappas is a 48 y.o. female  : 1975    MRN: [id number]  DATE: 2023  TIME: 2:02 PM    Assessment and Plan   Hemorrhoids, unspecified hemorrhoid type [K64.9]  1. Hemorrhoids, unspecified hemorrhoid type  hydrocortisone (ANUSOL-HC) 25 mg suppository      2. Pain of right hand  XR hand 3+ vw right            Patient Instructions     Hand x-rays show an accessory bone in area of concern  Likely benign  Tylenol or motrin OTC prn  F/u with hand specialist for further eval  Follow up with PCP in 3-5 days.  Proceed to  ER if symptoms worsen.    Chief Complaint     Chief Complaint   Patient presents with    Hand Pain     Right hand pain and bump x 2 months  Hemorid pain  x 2 days          History of Present Illness       HPI  Presents to clinic with complaint of pain in the right hand. States she noticed a bump in the area about 2 months ago. This area has been increasing in size and becoming more painful. Denies any injury that may have caused the pain. Also reports hemorrhoids for the last 2 days. Denies constipation or diarrhea. No anal bleed.    Review of Systems   Review of Systems   Constitutional:  Negative for fever.   Gastrointestinal:  Positive for rectal pain (bc of hemorrhoids). Negative for abdominal pain.   Genitourinary:  Negative for dysuria and frequency.   Musculoskeletal:  Positive for joint swelling (on the right hand, bc of an area that is swollen on the palm).         Current Medications       Current Outpatient Medications:     hydrocortisone (ANUSOL-HC) 25 mg suppository, Insert 1 suppository (25 mg total) into the rectum 2 (two) times a day, Disp: 12 suppository, Rfl: 0    Calcium Citrate-Vitamin D 1000-0.01 MG/30ML LIQD, , Disp: , Rfl:     Cholecalciferol (D3 Liquid) 25 MCG/0.04ML LIQD, , Disp: , Rfl:     clotrimazole-betamethasone (LOTRISONE) 1-0.05 % cream, Apply topically, Disp: , Rfl:     mometasone (ELOCON) 0.1 % lotion, APPLY TO  SCALP TWO TIMES DAILY FOR 2 WEEKS THEN ONCE EVERY DAY FOR 1 WEEK THEN ON WEEKENDS AS DIRECTED, Disp: , Rfl:     Multiple Vitamin (MULTIVITAMIN) tablet, Take 1 tablet by mouth daily, Disp: , Rfl:     valACYclovir (VALTREX) 1,000 mg tablet, Take 1 tablet (1,000 mg total) by mouth 3 (three) times a day for 7 days, Disp: 21 tablet, Rfl: 0    Current Allergies     Allergies as of 12/21/2023 - Reviewed 08/02/2023   Allergen Reaction Noted    Penicillins  06/03/2015    Doxycycline Hives 10/20/2017    Penicillin g Rash 09/09/2016            The following portions of the patient's history were reviewed and updated as appropriate: allergies, current medications, past family history, past medical history, past social history, past surgical history and problem list.     Past Medical History:   Diagnosis Date    Breast cyst        Past Surgical History:   Procedure Laterality Date    APPENDECTOMY      BREAST BIOPSY Left 2018    benign    HERNIA REPAIR      HYSTEROSCOPY W/ ENDOMETRIAL ABLATION  12/08/2015    US GUIDED BREAST BIOPSY LEFT COMPLETE Left 05/25/2018       Family History   Problem Relation Age of Onset    Hypertension Mother     No Known Problems Father     No Known Problems Daughter     Kidney disease Maternal Grandmother     Heart attack Maternal Grandfather     Cancer Paternal Grandmother     No Known Problems Paternal Grandfather     No Known Problems Brother     Cancer Maternal Aunt         vulva cancer    No Known Problems Maternal Aunt     No Known Problems Maternal Aunt     Cancer Paternal Aunt         non-hodgkins lymphoma    Lymphoma Paternal Aunt     No Known Problems Son     No Known Problems Son          Medications have been verified.        Objective   /64 (BP Location: Left arm, Patient Position: Sitting, Cuff Size: Adult)   Pulse 88   Temp 98.6 °F (37 °C) (Tympanic)   Resp 16   SpO2 100%   No LMP recorded. Patient has had an ablation.       Physical Exam     Physical Exam  Genitourinary:      Comments: Examination performed in the presence of a chaperone; Rea HODGES. There are two external hemorrhoids, small to medium sized. No active bleeding. No significant inflammation.   Musculoskeletal:         General: Swelling (swollen lesion on the ventral aspect of the 2nd MCP joint of the R hand. Movable.) and tenderness (mild TTP) present. Normal range of motion.      Comments:  strength is normal   Skin:     Capillary Refill: Capillary refill takes less than 2 seconds.      Findings: No bruising or erythema.

## 2023-12-22 DIAGNOSIS — Z12.11 SCREENING FOR COLON CANCER: Primary | ICD-10-CM

## 2023-12-29 ENCOUNTER — TELEPHONE (OUTPATIENT)
Age: 48
End: 2023-12-29

## 2023-12-29 NOTE — TELEPHONE ENCOUNTER
Patients GI provider: new pt    Number to return call: [phone]    Reason for call: Pt calling to sched colonoscopy. Will check w/insurance to see if OV would be covered and call back to sched.    Scheduled procedure/appointment date if applicable: N/A

## 2024-05-31 ENCOUNTER — NURSE TRIAGE (OUTPATIENT)
Age: 49
End: 2024-05-31

## 2024-05-31 NOTE — TELEPHONE ENCOUNTER
"Pt called in c/o having an In grown hair like bump that is the size of a marble. Also lymph node in the groin appears swollen. Pt denies fevers at this time. Pt stating that she has been doing warm soaks and compresses without relief.     Ricardo Text sent to Dr. Paul Silva Text received: Would recommend warm soaks and Motrin/ Tylenol for pain. She will need to be evaluated and will likely need an u/s. If pain becomes severe she should be evaluated in ED. Thank you    Patient was notified of Dr. Miller' recommendations and verbalized understanding. Patient was notified of the doctors recommendations and was notified that if she has any drainage from the site, fevers, or starts to feel worse with the pain that she should go to the emergency room to be further evaluated, pt verbalized understanding.     Reason for Disposition   Tender lump (swelling or \"ball\") at vaginal opening    Answer Assessment - Initial Assessment Questions  1. SYMPTOM: \"What's the main symptom you're concerned about?\" (e.g., pain, itching, dryness)      In grown hair like bump that is the size of a marble. Also lymph node in the groin appears swollen  2. LOCATION: \"Where is the  bump located?\" (e.g., inside/outside, left/right)      Right side on the labia   3. ONSET: \"When did the  bump  start?\"      2 days ago  4. PAIN: \"Is there any pain?\" If Yes, ask: \"How bad is it?\" (Scale: 1-10; mild, moderate, severe)      7/10 pain  5. ITCHING: \"Is there any itching?\" If Yes, ask: \"How bad is it?\" (Scale: 1-10; mild, moderate, severe)      Pt denies   6. CAUSE: \"What do you think is causing the discharge?\" \"Have you had the same problem before? What happened then?\"      Pt stating blood is coming out of the lump that she found   7. OTHER SYMPTOMS: \"Do you have any other symptoms?\" (e.g., fever, itching, vaginal bleeding, pain with urination, injury to genital area, vaginal foreign body)      Pt denies   8. PREGNANCY: \"Is there any chance you " "are pregnant?\" \"When was your last menstrual period?\"      Pt denies, pt had an ablasion 8 years ago.    Protocols used: Vaginal Symptoms-ADULT-[place]    "

## 2024-07-22 ENCOUNTER — HOSPITAL ENCOUNTER (OUTPATIENT)
Dept: RADIOLOGY | Age: 49
Discharge: HOME | End: 2024-07-22
Payer: COMMERCIAL

## 2024-07-22 VITALS — HEIGHT: 67 IN | BODY MASS INDEX: 24.8 KG/M2 | WEIGHT: 158 LBS

## 2024-07-22 DIAGNOSIS — Z12.31 VISIT FOR SCREENING MAMMOGRAM: ICD-10-CM

## 2024-07-22 PROCEDURE — 77063 BREAST TOMOSYNTHESIS BI: CPT

## 2024-07-22 PROCEDURE — 77067 SCR MAMMO BI INCL CAD: CPT

## 2024-08-12 ENCOUNTER — ANNUAL EXAM (OUTPATIENT)
Dept: OBGYN CLINIC | Facility: CLINIC | Age: 49
End: 2024-08-12
Payer: COMMERCIAL

## 2024-08-12 VITALS — DIASTOLIC BLOOD PRESSURE: 74 MMHG | BODY MASS INDEX: 24.43 KG/M2 | WEIGHT: 156 LBS | SYSTOLIC BLOOD PRESSURE: 122 MMHG

## 2024-08-12 DIAGNOSIS — Z78.0 MENOPAUSE: Primary | ICD-10-CM

## 2024-08-12 DIAGNOSIS — Z01.419 WOMEN'S ANNUAL ROUTINE GYNECOLOGICAL EXAMINATION: ICD-10-CM

## 2024-08-12 PROCEDURE — S0612 ANNUAL GYNECOLOGICAL EXAMINA: HCPCS | Performed by: OBSTETRICS & GYNECOLOGY

## 2024-08-12 NOTE — PROGRESS NOTES
Ambulatory Visit  Name: Linda Pappas      : 1975      MRN: [id number]  Encounter Provider: Refugio Duran MD  Encounter Date: 2024   Encounter department: [facility]    Assessment & Plan   1. Menopause  -     FSH and LH; Future  2. Women's annual routine gynecological examination  Patient was informed of a stable GYN examination.  She is content with her weight.  She continues to dentist on a regular basis.  Colonoscopies are done she will get a colonoscopy every 3 years.  We will check a serum FSH and LH to see if she is truly menopausal.  Orders have been written.  She should return to my office in 1 year unless new issues occur.  She will continue using lubricant for intimacy.    History of Present Illness     Linda Pappas is a 48 y.o. female who presents for her annual GYN examination.  She is a  6 para 3 with 3 prior vaginal deliveries.  She is many years status post endometrial ablation.  We have been following a serum FSH and LH to see if she is menopausal.  She is now experiencing more hot flashes and night sweats and some vaginal dryness.  We will check a serum FSH and LH to confirm the presence of menopause.  She feels safe at home.  She sees a dentist on a regular basis.  Denies a prior depression or anxiety.  She had a colonoscopy which showed polyps she needs to go every 3 years.  She is content with the weight would like to lose more.  There are no new major family illnesses to report.  Medication list reviewed no prescription medicine of note.    Review of Systems   Genitourinary:         Complaining of menopausal symptoms   All other systems reviewed and are negative.      Objective     /74   Wt 70.8 kg (156 lb)   LMP  (LMP Unknown) Comment: Ablation   BMI 24.43 kg/m²     Physical Exam  Vitals reviewed. Exam conducted with a chaperone present.   Constitutional:       Appearance: Normal appearance. She is normal weight.   HENT:      Head:  Normocephalic and atraumatic.      Nose: Nose normal.      Mouth/Throat:      Mouth: Mucous membranes are moist.   Eyes:      Extraocular Movements: Extraocular movements intact.      Pupils: Pupils are equal, round, and reactive to light.   Cardiovascular:      Rate and Rhythm: Normal rate and regular rhythm.   Pulmonary:      Effort: Pulmonary effort is normal.      Breath sounds: Normal breath sounds.   Chest:   Breasts:     Breasts are symmetrical.      Right: Normal.      Left: Normal.   Abdominal:      General: Abdomen is flat. Bowel sounds are normal.      Palpations: Abdomen is soft. There is no hepatomegaly or splenomegaly.      Tenderness: There is no abdominal tenderness.      Hernia: No hernia is present. There is no hernia in the left inguinal area or right inguinal area.   Genitourinary:     General: Normal vulva.      Pubic Area: No rash or pubic lice.       Labia:         Right: No rash, tenderness, lesion or injury.         Left: No rash, tenderness, lesion or injury.       Urethra: No prolapse, urethral pain, urethral swelling or urethral lesion.      Vagina: Normal. No signs of injury and foreign body. No vaginal discharge, erythema, tenderness, bleeding, lesions or prolapsed vaginal walls.      Cervix: Normal.      Uterus: Normal.       Adnexa: Right adnexa normal and left adnexa normal.        Right: No mass, tenderness or fullness.          Left: No mass, tenderness or fullness.        Rectum: Normal.      Comments: The external genitalia normal of the vagina is clean and cervix is closed parous uterus is anterior normal size and no cervical motion tenderness.  A Pap smear was not performed there is no evidence of prolapse.  The urethra and bladder are normal working relationship.  Musculoskeletal:         General: Normal range of motion.      Cervical back: Normal range of motion and neck supple.   Lymphadenopathy:      Upper Body:      Right upper body: No supraclavicular adenopathy.       Left upper body: No supraclavicular adenopathy.      Lower Body: No right inguinal adenopathy. No left inguinal adenopathy.   Skin:     General: Skin is warm.   Neurological:      General: No focal deficit present.      Mental Status: She is alert and oriented to person, place, and time.   Psychiatric:         Mood and Affect: Mood normal.         Behavior: Behavior normal.       Administrative Statements

## 2024-08-12 NOTE — PATIENT INSTRUCTIONS
The patient was informed of a stable probable menopausal GYN examination.  We will check a serum FSH to verify her menopause status.  She will continue get yearly mammograms.  She will continue getting colonoscopies every 3 years.  She is content with her weight.  She feels safe at home.  She sees a dentist on a regular basis.  There is no problem with depression or anxiety.

## 2024-09-13 LAB
FSH SERPL-ACNC: 61.28 MIU/ML
LH SERPL-ACNC: 20.96 MIU/ML

## 2025-06-30 ENCOUNTER — OFFICE VISIT (OUTPATIENT)
Dept: OBGYN CLINIC | Facility: CLINIC | Age: 50
End: 2025-06-30
Payer: COMMERCIAL

## 2025-06-30 ENCOUNTER — NURSE TRIAGE (OUTPATIENT)
Age: 50
End: 2025-06-30

## 2025-06-30 VITALS — SYSTOLIC BLOOD PRESSURE: 118 MMHG | WEIGHT: 164 LBS | DIASTOLIC BLOOD PRESSURE: 72 MMHG | BODY MASS INDEX: 25.69 KG/M2

## 2025-06-30 DIAGNOSIS — N76.4 LEFT GENITAL LABIAL ABSCESS: Primary | ICD-10-CM

## 2025-06-30 PROCEDURE — 99213 OFFICE O/P EST LOW 20 MIN: CPT | Performed by: OBSTETRICS & GYNECOLOGY

## 2025-06-30 RX ORDER — AZITHROMYCIN 500 MG/1
TABLET, FILM COATED ORAL
Qty: 2 TABLET | Refills: 0 | Status: SHIPPED | OUTPATIENT
Start: 2025-06-30 | End: 2025-07-01

## 2025-06-30 RX ORDER — CEPHALEXIN 500 MG/1
CAPSULE ORAL
Qty: 10 CAPSULE | Refills: 1 | Status: SHIPPED | OUTPATIENT
Start: 2025-06-30 | End: 2025-06-30 | Stop reason: ALTCHOICE

## 2025-06-30 RX ORDER — GINSENG 100 MG
1 CAPSULE ORAL 2 TIMES DAILY
Qty: 14 G | Refills: 1 | Status: SHIPPED | OUTPATIENT
Start: 2025-06-30

## 2025-06-30 NOTE — TELEPHONE ENCOUNTER
"REASON FOR CONVERSATION: Abscess    SYMPTOMS: skin abscess     OTHER HEALTH INFORMATION: Patient called office complaining of what she believes is a skin abscess between her vagina and buttocks. She states the area is swollen and painful. She states it is dime size, but she is unsure what it looks like. She has a swollen lymph node in her left groin. She rates the pain of the abscess a 4. She has had abscesses in the same area in the past. She denies fever or any other symptoms. Scheduled appointment today.     PROTOCOL DISPOSITION: See Today or Tomorrow in Office    CARE ADVICE PROVIDED: Advised she call back if fever occurs or any other symptoms.     PRACTICE FOLLOW-UP: none needed         Reason for Disposition   Patient wants to be seen    Answer Assessment - Initial Assessment Questions  1. APPEARANCE of BOIL: \"What does the boil look like?\"       Unknown , area is swollen   2. LOCATION: \"Where is the boil located?\"       Between vagina and buttocks ( perineum area )   3. NUMBER: \"How many boils are there?\"       1   4. SIZE: \"How big is the boil?\" (e.g., inches, cm; compare to size of a coin or other object)      Dime size   5. ONSET: \"When did the boil start?\"      Yesterday morning   6. PAIN: \"Is there any pain?\" If Yes, ask: \"How bad is the pain?\"   (Scale 1-10; or mild, moderate, severe)      4  7. FEVER: \"Do you have a fever?\" If Yes, ask: \"What is it, how was it measured, and when did it start?\"       No   8. SOURCE: \"Have you been around anyone with boils or other Staph infections?\" \"Have you ever had boils before?\"      Has had abscesses in the past in the same area   9. OTHER SYMPTOMS: \"Do you have any other symptoms?\" (e.g., shaking chills, weakness, rash elsewhere on body)      Lymph node swollen in groin on left side .  Psoriasis rash on elbow.    10. PREGNANCY: \"Is there any chance you are pregnant?\" \"When was your last menstrual period?\"        In menopause    Protocols used: Boil (Skin " Abscess)-Adult-[place]

## 2025-06-30 NOTE — PATIENT INSTRUCTIONS
The patient was informed the presence of a abscess on the left side inferior to the labia majora on the left side.  She will now continue to apply warm compresses.  She also will apply bacitracin ointment to the outside.  She started prescription for azithromycin 1 tablet today and 1 tablet tomorrow.  She will call me on Wednesday for progress report if things get worse we will see her back in the office or the in the middle night she should go to the emergency is department for probable incision and drainage.

## 2025-06-30 NOTE — PROGRESS NOTES
This is a 49-year-old female well-known to me who has recurrent genital boils or abscesses.  She has 1 started yesterday.  On the left side.  At the inferior margins of the left labia majora.  It looks like it is coming to ahead.  She been applying warm compresses which is working.  There is no problem with voiding is no problem bowel movements.    She is instructed to continue using warm compresses.  Will also have her put bacitracin ointment on top of the lesion for daily for the next few days.  Greatest diameter probably 3-1/2 cm.  She also given a prescription for azithromycin 1 capsule today and 1 tablet tomorrow.  She will call me on Wednesday for the progress report.  If fever chills or things gets worse she will let me know.

## 2025-07-02 ENCOUNTER — TELEPHONE (OUTPATIENT)
Age: 50
End: 2025-07-02

## 2025-07-02 NOTE — TELEPHONE ENCOUNTER
Patient called office states she was asked by Dr. Duran to call today to provide an update on how she feels. She states she is doing better. Her pain is improved, she rates it a 1. Her abscess does have some bloody, yellow / white drainage. She denies odor, fever. She is concerned that due to it being open she is at risk for infection. She would like to know if she should be started on antibiotics.

## 2025-07-25 ENCOUNTER — HOSPITAL ENCOUNTER (OUTPATIENT)
Dept: RADIOLOGY | Age: 50
Discharge: HOME | End: 2025-07-25
Payer: COMMERCIAL

## 2025-07-25 DIAGNOSIS — Z12.31 VISIT FOR SCREENING MAMMOGRAM: ICD-10-CM

## 2025-07-25 PROCEDURE — 77067 SCR MAMMO BI INCL CAD: CPT

## 2025-07-25 PROCEDURE — 77063 BREAST TOMOSYNTHESIS BI: CPT

## 2025-08-12 ENCOUNTER — ANNUAL EXAM (OUTPATIENT)
Dept: OBGYN CLINIC | Facility: CLINIC | Age: 50
End: 2025-08-12
Payer: COMMERCIAL